# Patient Record
Sex: FEMALE | Race: WHITE | Employment: OTHER | URBAN - METROPOLITAN AREA
[De-identification: names, ages, dates, MRNs, and addresses within clinical notes are randomized per-mention and may not be internally consistent; named-entity substitution may affect disease eponyms.]

---

## 2017-02-20 ENCOUNTER — HOSPITAL ENCOUNTER (EMERGENCY)
Age: 57
Discharge: HOME | End: 2017-02-21
Attending: EMERGENCY MEDICINE
Payer: COMMERCIAL

## 2017-02-20 ENCOUNTER — APPOINTMENT (OUTPATIENT)
Dept: GENERAL RADIOLOGY | Age: 57
End: 2017-02-20
Attending: EMERGENCY MEDICINE
Payer: COMMERCIAL

## 2017-02-20 DIAGNOSIS — R10.13 ABDOMINAL PAIN, EPIGASTRIC: Primary | ICD-10-CM

## 2017-02-20 DIAGNOSIS — Z79.01 ANTICOAGULANT LONG-TERM USE: ICD-10-CM

## 2017-02-20 DIAGNOSIS — R11.2 NAUSEA AND VOMITING, INTRACTABILITY OF VOMITING NOT SPECIFIED, UNSPECIFIED VOMITING TYPE: ICD-10-CM

## 2017-02-20 LAB
ALBUMIN SERPL BCP-MCNC: 4.4 G/DL (ref 3.5–5)
ALBUMIN/GLOB SERPL: 1.2 {RATIO} (ref 1.1–2.2)
ALP SERPL-CCNC: 64 U/L (ref 45–117)
ALT SERPL-CCNC: 35 U/L (ref 12–78)
ANION GAP BLD CALC-SCNC: 9 MMOL/L (ref 5–15)
AST SERPL W P-5'-P-CCNC: 24 U/L (ref 15–37)
BASOPHILS # BLD AUTO: 0 K/UL (ref 0–0.1)
BASOPHILS # BLD: 0 % (ref 0–1)
BILIRUB SERPL-MCNC: 0.2 MG/DL (ref 0.2–1)
BUN SERPL-MCNC: 18 MG/DL (ref 6–20)
BUN/CREAT SERPL: 26 (ref 12–20)
CALCIUM SERPL-MCNC: 9 MG/DL (ref 8.5–10.1)
CHLORIDE SERPL-SCNC: 103 MMOL/L (ref 97–108)
CK SERPL-CCNC: 145 U/L (ref 26–192)
CO2 SERPL-SCNC: 26 MMOL/L (ref 21–32)
CREAT SERPL-MCNC: 0.69 MG/DL (ref 0.55–1.02)
EOSINOPHIL # BLD: 0 K/UL (ref 0–0.4)
EOSINOPHIL NFR BLD: 0 % (ref 0–7)
ERYTHROCYTE [DISTWIDTH] IN BLOOD BY AUTOMATED COUNT: 13 % (ref 11.5–14.5)
GLOBULIN SER CALC-MCNC: 3.6 G/DL (ref 2–4)
GLUCOSE SERPL-MCNC: 125 MG/DL (ref 65–100)
HCT VFR BLD AUTO: 42.5 % (ref 35–47)
HGB BLD-MCNC: 14.5 G/DL (ref 11.5–16)
INR BLD: 2.7 (ref 0.9–1.2)
LYMPHOCYTES # BLD AUTO: 7 % (ref 12–49)
LYMPHOCYTES # BLD: 0.9 K/UL (ref 0.8–3.5)
MAGNESIUM SERPL-MCNC: 1.9 MG/DL (ref 1.6–2.4)
MCH RBC QN AUTO: 30.1 PG (ref 26–34)
MCHC RBC AUTO-ENTMCNC: 34.1 G/DL (ref 30–36.5)
MCV RBC AUTO: 88.4 FL (ref 80–99)
MONOCYTES # BLD: 0.7 K/UL (ref 0–1)
MONOCYTES NFR BLD AUTO: 5 % (ref 5–13)
NEUTS SEG # BLD: 12 K/UL (ref 1.8–8)
NEUTS SEG NFR BLD AUTO: 88 % (ref 32–75)
PLATELET # BLD AUTO: 227 K/UL (ref 150–400)
POTASSIUM SERPL-SCNC: 3.8 MMOL/L (ref 3.5–5.1)
PROT SERPL-MCNC: 8 G/DL (ref 6.4–8.2)
RBC # BLD AUTO: 4.81 M/UL (ref 3.8–5.2)
SODIUM SERPL-SCNC: 138 MMOL/L (ref 136–145)
TROPONIN I SERPL-MCNC: <0.04 NG/ML
WBC # BLD AUTO: 13.7 K/UL (ref 3.6–11)

## 2017-02-20 PROCEDURE — 80053 COMPREHEN METABOLIC PANEL: CPT | Performed by: EMERGENCY MEDICINE

## 2017-02-20 PROCEDURE — 84484 ASSAY OF TROPONIN QUANT: CPT | Performed by: EMERGENCY MEDICINE

## 2017-02-20 PROCEDURE — 94762 N-INVAS EAR/PLS OXIMTRY CONT: CPT

## 2017-02-20 PROCEDURE — 82550 ASSAY OF CK (CPK): CPT | Performed by: EMERGENCY MEDICINE

## 2017-02-20 PROCEDURE — 74011000250 HC RX REV CODE- 250: Performed by: EMERGENCY MEDICINE

## 2017-02-20 PROCEDURE — 74011250636 HC RX REV CODE- 250/636: Performed by: EMERGENCY MEDICINE

## 2017-02-20 PROCEDURE — 99284 EMERGENCY DEPT VISIT MOD MDM: CPT

## 2017-02-20 PROCEDURE — 74011250637 HC RX REV CODE- 250/637: Performed by: EMERGENCY MEDICINE

## 2017-02-20 PROCEDURE — 93005 ELECTROCARDIOGRAM TRACING: CPT

## 2017-02-20 PROCEDURE — 71020 XR CHEST PA LAT: CPT

## 2017-02-20 PROCEDURE — 83690 ASSAY OF LIPASE: CPT | Performed by: EMERGENCY MEDICINE

## 2017-02-20 PROCEDURE — 83605 ASSAY OF LACTIC ACID: CPT | Performed by: EMERGENCY MEDICINE

## 2017-02-20 PROCEDURE — 36415 COLL VENOUS BLD VENIPUNCTURE: CPT | Performed by: EMERGENCY MEDICINE

## 2017-02-20 PROCEDURE — 85610 PROTHROMBIN TIME: CPT

## 2017-02-20 PROCEDURE — 85025 COMPLETE CBC W/AUTO DIFF WBC: CPT | Performed by: EMERGENCY MEDICINE

## 2017-02-20 PROCEDURE — 96361 HYDRATE IV INFUSION ADD-ON: CPT

## 2017-02-20 PROCEDURE — 96374 THER/PROPH/DIAG INJ IV PUSH: CPT

## 2017-02-20 PROCEDURE — 83735 ASSAY OF MAGNESIUM: CPT | Performed by: EMERGENCY MEDICINE

## 2017-02-20 PROCEDURE — 96375 TX/PRO/DX INJ NEW DRUG ADDON: CPT

## 2017-02-20 RX ORDER — PROMETHAZINE HYDROCHLORIDE 25 MG/1
25 TABLET ORAL
Status: COMPLETED | OUTPATIENT
Start: 2017-02-20 | End: 2017-02-20

## 2017-02-20 RX ORDER — ONDANSETRON 4 MG/1
4 TABLET, ORALLY DISINTEGRATING ORAL
Status: DISCONTINUED | OUTPATIENT
Start: 2017-02-20 | End: 2017-02-20

## 2017-02-20 RX ORDER — EZETIMIBE 10 MG/1
10 TABLET ORAL DAILY
COMMUNITY

## 2017-02-20 RX ORDER — LANOLIN ALCOHOL/MO/W.PET/CERES
500 CREAM (GRAM) TOPICAL DAILY
COMMUNITY

## 2017-02-20 RX ORDER — FAMOTIDINE 10 MG/ML
20 INJECTION INTRAVENOUS
Status: COMPLETED | OUTPATIENT
Start: 2017-02-20 | End: 2017-02-20

## 2017-02-20 RX ORDER — ONDANSETRON 2 MG/ML
4 INJECTION INTRAMUSCULAR; INTRAVENOUS
Status: COMPLETED | OUTPATIENT
Start: 2017-02-20 | End: 2017-02-20

## 2017-02-20 RX ORDER — WARFARIN SODIUM 5 MG/1
10 TABLET ORAL DAILY
COMMUNITY

## 2017-02-20 RX ORDER — LISINOPRIL 5 MG/1
5 TABLET ORAL 2 TIMES DAILY
COMMUNITY

## 2017-02-20 RX ORDER — MELATONIN
1000 DAILY
COMMUNITY

## 2017-02-20 RX ADMIN — ONDANSETRON HYDROCHLORIDE 4 MG: 2 INJECTION, SOLUTION INTRAMUSCULAR; INTRAVENOUS at 23:17

## 2017-02-20 RX ADMIN — SODIUM CHLORIDE 1000 ML: 900 INJECTION, SOLUTION INTRAVENOUS at 23:16

## 2017-02-20 RX ADMIN — PROMETHAZINE HYDROCHLORIDE 25 MG: 25 TABLET ORAL at 21:54

## 2017-02-20 RX ADMIN — FAMOTIDINE 20 MG: 10 INJECTION, SOLUTION INTRAVENOUS at 23:17

## 2017-02-21 ENCOUNTER — APPOINTMENT (OUTPATIENT)
Dept: CT IMAGING | Age: 57
End: 2017-02-21
Attending: EMERGENCY MEDICINE
Payer: COMMERCIAL

## 2017-02-21 VITALS
OXYGEN SATURATION: 100 % | HEIGHT: 65 IN | TEMPERATURE: 98 F | DIASTOLIC BLOOD PRESSURE: 62 MMHG | HEART RATE: 100 BPM | SYSTOLIC BLOOD PRESSURE: 145 MMHG | RESPIRATION RATE: 16 BRPM | WEIGHT: 172.4 LBS | BODY MASS INDEX: 28.72 KG/M2

## 2017-02-21 LAB
APPEARANCE UR: CLEAR
BACTERIA URNS QL MICRO: NEGATIVE /HPF
BILIRUB UR QL: NEGATIVE
COLOR UR: NORMAL
EPITH CASTS URNS QL MICRO: NORMAL /LPF
GLUCOSE UR STRIP.AUTO-MCNC: NEGATIVE MG/DL
HGB UR QL STRIP: NEGATIVE
KETONES UR QL STRIP.AUTO: NEGATIVE MG/DL
LACTATE SERPL-SCNC: 1.8 MMOL/L (ref 0.4–2)
LEUKOCYTE ESTERASE UR QL STRIP.AUTO: NEGATIVE
LIPASE SERPL-CCNC: 122 U/L (ref 73–393)
NITRITE UR QL STRIP.AUTO: NEGATIVE
PH UR STRIP: 7.5 [PH] (ref 5–8)
PROT UR STRIP-MCNC: NEGATIVE MG/DL
RBC #/AREA URNS HPF: NORMAL /HPF (ref 0–5)
SP GR UR REFRACTOMETRY: 1.02 (ref 1–1.03)
UA: UC IF INDICATED,UAUC: NORMAL
UROBILINOGEN UR QL STRIP.AUTO: 0.2 EU/DL (ref 0.2–1)
WBC URNS QL MICRO: NORMAL /HPF (ref 0–4)

## 2017-02-21 PROCEDURE — 81001 URINALYSIS AUTO W/SCOPE: CPT | Performed by: EMERGENCY MEDICINE

## 2017-02-21 PROCEDURE — 74011250636 HC RX REV CODE- 250/636: Performed by: EMERGENCY MEDICINE

## 2017-02-21 PROCEDURE — 74011636320 HC RX REV CODE- 636/320: Performed by: EMERGENCY MEDICINE

## 2017-02-21 RX ORDER — PROCHLORPERAZINE EDISYLATE 5 MG/ML
5 INJECTION INTRAMUSCULAR; INTRAVENOUS
Status: COMPLETED | OUTPATIENT
Start: 2017-02-21 | End: 2017-02-21

## 2017-02-21 RX ORDER — ONDANSETRON 4 MG/1
4 TABLET, ORALLY DISINTEGRATING ORAL
Qty: 10 TAB | Refills: 0 | Status: SHIPPED | OUTPATIENT
Start: 2017-02-21

## 2017-02-21 RX ORDER — SODIUM CHLORIDE 0.9 % (FLUSH) 0.9 %
10 SYRINGE (ML) INJECTION
Status: DISCONTINUED | OUTPATIENT
Start: 2017-02-21 | End: 2017-02-21 | Stop reason: HOSPADM

## 2017-02-21 RX ORDER — SODIUM CHLORIDE 9 MG/ML
50 INJECTION, SOLUTION INTRAVENOUS
Status: DISCONTINUED | OUTPATIENT
Start: 2017-02-21 | End: 2017-02-21 | Stop reason: HOSPADM

## 2017-02-21 RX ORDER — PROMETHAZINE HYDROCHLORIDE 25 MG/1
25 SUPPOSITORY RECTAL
Qty: 12 SUPPOSITORY | Refills: 0 | Status: SHIPPED | OUTPATIENT
Start: 2017-02-21

## 2017-02-21 RX ORDER — DIPHENHYDRAMINE HYDROCHLORIDE 50 MG/ML
12.5 INJECTION, SOLUTION INTRAMUSCULAR; INTRAVENOUS
Status: COMPLETED | OUTPATIENT
Start: 2017-02-21 | End: 2017-02-21

## 2017-02-21 RX ADMIN — DIPHENHYDRAMINE HYDROCHLORIDE 12.5 MG: 50 INJECTION, SOLUTION INTRAMUSCULAR; INTRAVENOUS at 01:06

## 2017-02-21 RX ADMIN — PROCHLORPERAZINE EDISYLATE 5 MG: 5 INJECTION INTRAMUSCULAR; INTRAVENOUS at 01:05

## 2017-02-21 RX ADMIN — DIATRIZOATE MEGLUMINE AND DIATRIZOATE SODIUM 30 ML: 600; 100 SOLUTION ORAL; RECTAL at 01:05

## 2017-02-21 RX ADMIN — SODIUM CHLORIDE 1000 ML: 900 INJECTION, SOLUTION INTRAVENOUS at 01:21

## 2017-02-21 NOTE — ED NOTES
Pt resting in stretcher. Call bell within reach. Pt updated plan of care. Provided with medications. Hooked up to monitor. Awaiting lab results. No other complaints voiced at this time.

## 2017-02-21 NOTE — DISCHARGE INSTRUCTIONS
Abdominal Pain: Care Instructions  Your Care Instructions    Abdominal pain has many possible causes. Some aren't serious and get better on their own in a few days. Others need more testing and treatment. If your pain continues or gets worse, you need to be rechecked and may need more tests to find out what is wrong. You may need surgery to correct the problem. Don't ignore new symptoms, such as fever, nausea and vomiting, urination problems, pain that gets worse, and dizziness. These may be signs of a more serious problem. Your doctor may have recommended a follow-up visit in the next 8 to 12 hours. If you are not getting better, you may need more tests or treatment. The doctor has checked you carefully, but problems can develop later. If you notice any problems or new symptoms, get medical treatment right away. Follow-up care is a key part of your treatment and safety. Be sure to make and go to all appointments, and call your doctor if you are having problems. It's also a good idea to know your test results and keep a list of the medicines you take. How can you care for yourself at home? · Rest until you feel better. · To prevent dehydration, drink plenty of fluids, enough so that your urine is light yellow or clear like water. Choose water and other caffeine-free clear liquids until you feel better. If you have kidney, heart, or liver disease and have to limit fluids, talk with your doctor before you increase the amount of fluids you drink. · If your stomach is upset, eat mild foods, such as rice, dry toast or crackers, bananas, and applesauce. Try eating several small meals instead of two or three large ones. · Wait until 48 hours after all symptoms have gone away before you have spicy foods, alcohol, and drinks that contain caffeine. · Do not eat foods that are high in fat. · Avoid anti-inflammatory medicines such as aspirin, ibuprofen (Advil, Motrin), and naproxen (Aleve).  These can cause stomach upset. Talk to your doctor if you take daily aspirin for another health problem. When should you call for help? Call 911 anytime you think you may need emergency care. For example, call if:  · You passed out (lost consciousness). · You pass maroon or very bloody stools. · You vomit blood or what looks like coffee grounds. · You have new, severe belly pain. Call your doctor now or seek immediate medical care if:  · Your pain gets worse, especially if it becomes focused in one area of your belly. · You have a new or higher fever. · Your stools are black and look like tar, or they have streaks of blood. · You have unexpected vaginal bleeding. · You have symptoms of a urinary tract infection. These may include:  ¨ Pain when you urinate. ¨ Urinating more often than usual.  ¨ Blood in your urine. · You are dizzy or lightheaded, or you feel like you may faint. Watch closely for changes in your health, and be sure to contact your doctor if:  · You are not getting better after 1 day (24 hours). Where can you learn more? Go to http://leahNuservgen.info/. Enter Z521 in the search box to learn more about \"Abdominal Pain: Care Instructions. \"  Current as of: May 27, 2016  Content Version: 11.1  © 5801-3783 [employer]. Care instructions adapted under license by [employer] (which disclaims liability or warranty for this information). If you have questions about a medical condition or this instruction, always ask your healthcare professional. [place] [address] any warranty or liability for your use of this information. Nausea and Vomiting: Care Instructions  Your Care Instructions    When you are nauseated, you may feel weak and sweaty and notice a lot of saliva in your mouth. Nausea often leads to vomiting. Most of the time you do not need to worry about nausea and vomiting, but they can be signs of other illnesses.   Two common causes of nausea and vomiting are stomach flu and food poisoning. Nausea and vomiting from viral stomach flu will usually start to improve within 24 hours. Nausea and vomiting from food poisoning may last from 12 to 48 hours. The doctor has checked you carefully, but problems can develop later. If you notice any problems or new symptoms, get medical treatment right away. Follow-up care is a key part of your treatment and safety. Be sure to make and go to all appointments, and call your doctor if you are having problems. It's also a good idea to know your test results and keep a list of the medicines you take. How can you care for yourself at home? · To prevent dehydration, drink plenty of fluids, enough so that your urine is light yellow or clear like water. Choose water and other caffeine-free clear liquids until you feel better. If you have kidney, heart, or liver disease and have to limit fluids, talk with your doctor before you increase the amount of fluids you drink. · Rest in bed until you feel better. · When you are able to eat, try clear soups, mild foods, and liquids until all symptoms are gone for 12 to 48 hours. Other good choices include dry toast, crackers, cooked cereal, and gelatin dessert, such as Jell-O. When should you call for help? Call 911 anytime you think you may need emergency care. For example, call if:  · You passed out (lost consciousness). Call your doctor now or seek immediate medical care if:  · You have symptoms of dehydration, such as:  ¨ Dry eyes and a dry mouth. ¨ Passing only a little dark urine. ¨ Feeling thirstier than usual.  · You have new or worsening belly pain. · You have a new or higher fever. · You vomit blood or what looks like coffee grounds. Watch closely for changes in your health, and be sure to contact your doctor if:  · You have ongoing nausea and vomiting. · Your vomiting is getting worse. · Your vomiting lasts longer than 2 days.   · You are not getting better as expected. Where can you learn more? Go to http://leah-gen.info/. Enter 25 [id number] in the search box to learn more about \"Nausea and Vomiting: Care Instructions. \"  Current as of: May 27, 2016  Content Version: 11.1  © 6652-1473 [employer]. Care instructions adapted under license by [employer] (which disclaims liability or warranty for this information). If you have questions about a medical condition or this instruction, always ask your healthcare professional. [address] any warranty or liability for your use of this information.

## 2017-02-21 NOTE — ED NOTES
CT tech at bedside to take pt for imaging. Pt refusing to go to CT. Spoke with Dr. Jeannie Carlin who came to bedside to speak with pt. Pt now agrees to have imaging done. CT tech returned to room to take pt for scan. Pt again refuses to have imaging done and wishes to be discharged. Dr. Jeannie Carlin made aware. Discussed with pt at length several times need for CT. Pt continues to refuse and wants to be discharged. Saline lock removed. Pt discharged via wheelchair with ED staff. Accompanied by family.

## 2017-02-21 NOTE — ED NOTES
Assumed care of pt at this time, received bedside report from Emi Krause [facility] with pt included in care. Pt is resting in room, with call bell in reach. All questions answered.

## 2017-02-21 NOTE — ED NOTES
Pt states she is unable to drink anymore CT contrast. Spoke with Dr. Tana Mosley, she would like to proceed with scan. Spoke with CT tech who states they will send for her.

## 2017-02-21 NOTE — ED NOTES
Bedside and Verbal shift change report given to ANDREW Limon RN (oncoming nurse) by Elmarie Nelson. Stellwag, RN (offgoing nurse). Report included the following information SBAR. Pt to be transferred to room 10 after CT.

## 2017-02-21 NOTE — ED NOTES
Bedside shift change report given to Brennan Quintana RN (oncoming nurse) by Mecca Neff RN (offgoing nurse). Report included the following information ED Summary.

## 2017-02-21 NOTE — ED TRIAGE NOTES
Pt received to room from [facility]. Reporting had been at the mall and had a chicken panini with pesto for lunch around 1500. Reports then around 1900 began getting nauseated and vomited. Also got dizzy. Denies any recent fever or chills at this time.

## 2017-02-21 NOTE — ED PROVIDER NOTES
HPI Comments: Vanessa Brennan is a 49 y.o. female, pmhx PE / HTN, who presents ambulatory to the ED c/o multiple episodes of nausea, vomiting, and generalized malaise x 1900 this evening. Pt reports additional HA, abd pain, chills, and a subjective fever. She further notes becoming dizzy when standing and expresses concern for her elevated BP (162/112) following onset of sxs this evening. Pt states she has had heart burn since lunch this afternoon and has taken Tums, Omeprazole, and Cyndy [place] with no relief of sxs. She denies any hx of abd surgery. Pt reports a hx of hemorrhoids, hypercholesterolemia, HTN, and PE. She notes adherence with her rx'd Coumadin and denies any excess use of NSAIDS. She specifically denies any recent diarrhea, urinary sxs, dizziness, heart palpitations, CP, or BLE swelling. PCP: Kerry Guardado MD    Allergies: NKDA  PMHx: Significant for PE, HTN, hypercholesterolemia, hemorrhoids  PSHx: Significant for orthopedic surgery  Social Hx: -tobacco (former), -EtOH, -Illicit Drugs     There are no other complaints, changes, or physical findings at this time. The history is provided by the patient. Past Medical History:   Diagnosis Date    Hypertension     Thromboembolus [facility])      pulmonary emboli bilaterally       Past Surgical History:   Procedure Laterality Date    Hx orthopaedic       \"hammer toe surgery\", carpal tunnel bilateral hands, and spinal effusion         History reviewed. No pertinent family history. Social History     Social History    Marital status:      Spouse name: N/A    Number of children: N/A    Years of education: N/A     Occupational History    Not on file.      Social History Main Topics    Smoking status: Former Smoker     Packs/day: 1.50     Years: 13.00    Smokeless tobacco: Not on file    Alcohol use No    Drug use: No    Sexual activity: Not on file     Other Topics Concern    Not on file     Social History Narrative    No narrative on file         ALLERGIES: Review of patient's allergies indicates no known allergies. Review of Systems   Constitutional: Positive for chills and fever.        +generalized malaise   Eyes: Negative. Respiratory: Negative. Negative for shortness of breath. Cardiovascular: Negative for chest pain, palpitations and leg swelling. Gastrointestinal: Positive for nausea and vomiting. Negative for abdominal pain and diarrhea. Endocrine: Negative. Genitourinary: Negative. Negative for difficulty urinating, dysuria, flank pain, frequency and hematuria. Musculoskeletal: Negative. Skin: Negative. Allergic/Immunologic: Negative. Neurological: Negative. Negative for dizziness. Psychiatric/Behavioral: Negative for suicidal ideas. All other systems reviewed and are negative. Patient Vitals for the past 12 hrs:   Temp Pulse Resp BP SpO2   02/21/17 0227 - 100 - 145/62 100 %   02/21/17 0100 - (!) 104 - 155/84 97 %   02/21/17 0001 - 96 - 139/59 100 %   02/20/17 2345 - - - 138/61 100 %   02/20/17 2330 - - - 147/63 100 %   02/20/17 2329 - - - 151/50 -   02/20/17 2108 98 °F (36.7 °C) (!) 104 16 (!) 147/93 100 %             Physical Exam   Nursing note and vitals reviewed.   General appearance - well nourished, well appearing, and in no distress  Eyes - pupils equal and reactive, extraocular eye movements intact  ENT - mucous membranes moist, pharynx normal without lesions  Neck - supple, no significant adenopathy; non-tender to palpation  Chest - clear to auscultation, no wheezes, rales or rhonchi; non-tender to palpation  Heart - normal rate and regular rhythm, S1 and S2 normal, no murmurs noted  Abdomen - soft, mild generalized abd tenderness to palpation greater in the epigastric region, nondistended, no masses or organomegaly  Musculoskeletal - no joint tenderness, deformity or swelling; normal ROM  Extremities - peripheral pulses normal, no pedal edema  Skin - normal coloration and turgor, no rashes  Neurological - alert, oriented x3, normal speech, no focal findings or movement disorder noted  Written by LM Ochoa, as dictated by Keenan Pinzon MD    MDM  Number of Diagnoses or Management Options  Diagnosis management comments: DDx: gastroenteritis, dehydration, electrolyte abnormality, orthostatic HTN, viral syndrome, SBO       Amount and/or Complexity of Data Reviewed  Clinical lab tests: reviewed and ordered  Tests in the radiology section of CPT®: reviewed and ordered  Tests in the medicine section of CPT®: ordered and reviewed  Review and summarize past medical records: yes  Independent visualization of images, tracings, or specimens: yes    Patient Progress  Patient progress: stable        Procedures    EKG interpretation: (Preliminary) 2114  Rhythm: normal sinus rhythm. Rate (approx.): 96bpm; Axis: normal; Normal MO, QRS intervals, prolonged QTc intervals; ST/T wave: non-specific changes; Other findings: none. Written by LM Acevedo, as dictated by Keenan Pinzon MD      PROGRESS NOTE:  12:13 AM  Pt reevaluated. Pt states her current \"discomfort\" level is 4/10. Pt requesting discharge at this time. Written by LM Ochoa, as dictated by Keenan Pinzon MD    PROGRESS NOTE:  1:12 AM  Pt reevaluated. Spoke with pt and  regarding pt's request for discharge; updated on all available lab and imaging findings. Discussed importance of further evaluation via CT scan. Pt remains tachycardic and nauseated. She states the request for discharge came from her  who does not wish to wait. Pt agrees to stay for scan and additional IVF,  agrees to come pick the pt up as needed. Written by LM Ochoa, as dictated by Keenan Pinzon MD    PROGRESS NOTE:  2:41 AM  Pt reevaluated. Pt able to finish 1/2 of her contrast. Now states she does not wish to wait for the CT scan.  Requesting discharge again at this time. Discussed cannot r/o infection or bowel obstruction; pt agreed to have CT scan. When CT tech tried to take pt for imaging pt again stated she does not want the scan and wishes to be discharged. Pt given return precautions. Updated on all available lab and imaging findings. Pt agrees to follow up with her PCP as scheduled tomorrow. Vital signs stable for discharge. Written by Lotus Lisa ED Scribe, as dictated by Steffanie Baca MD    LABORATORY TESTS:  Recent Results (from the past 12 hour(s))   EKG, 12 LEAD, INITIAL    Collection Time: 02/20/17  9:14 PM   Result Value Ref Range    Ventricular Rate 96 BPM    Atrial Rate 96 BPM    P-R Interval 144 ms    QRS Duration 88 ms    Q-T Interval 382 ms    QTC Calculation (Bezet) 482 ms    Calculated P Axis 56 degrees    Calculated R Axis 37 degrees    Calculated T Axis 51 degrees    Diagnosis       Normal sinus rhythm  Prolonged QT  Abnormal ECG  No previous ECGs available     CBC WITH AUTOMATED DIFF    Collection Time: 02/20/17  9:47 PM   Result Value Ref Range    WBC 13.7 (H) 3.6 - 11.0 K/uL    RBC 4.81 3.80 - 5.20 M/uL    HGB 14.5 11.5 - 16.0 g/dL    HCT 42.5 35.0 - 47.0 %    MCV 88.4 80.0 - 99.0 FL    MCH 30.1 26.0 - 34.0 PG    MCHC 34.1 30.0 - 36.5 g/dL    RDW 13.0 11.5 - 14.5 %    PLATELET 135 463 - 577 K/uL    NEUTROPHILS 88 (H) 32 - 75 %    LYMPHOCYTES 7 (L) 12 - 49 %    MONOCYTES 5 5 - 13 %    EOSINOPHILS 0 0 - 7 %    BASOPHILS 0 0 - 1 %    ABS. NEUTROPHILS 12.0 (H) 1.8 - 8.0 K/UL    ABS. LYMPHOCYTES 0.9 0.8 - 3.5 K/UL    ABS. MONOCYTES 0.7 0.0 - 1.0 K/UL    ABS. EOSINOPHILS 0.0 0.0 - 0.4 K/UL    ABS.  BASOPHILS 0.0 0.0 - 0.1 K/UL   METABOLIC PANEL, COMPREHENSIVE    Collection Time: 02/20/17  9:47 PM   Result Value Ref Range    Sodium 138 136 - 145 mmol/L    Potassium 3.8 3.5 - 5.1 mmol/L    Chloride 103 97 - 108 mmol/L    CO2 26 21 - 32 mmol/L    Anion gap 9 5 - 15 mmol/L    Glucose 125 (H) 65 - 100 mg/dL    BUN 18 6 - 20 MG/DL    Creatinine 0.69 0.55 - 1.02 MG/DL    BUN/Creatinine ratio 26 (H) 12 - 20      GFR est AA >60 >60 ml/min/1.73m2    GFR est non-AA >60 >60 ml/min/1.73m2    Calcium 9.0 8.5 - 10.1 MG/DL    Bilirubin, total 0.2 0.2 - 1.0 MG/DL    ALT (SGPT) 35 12 - 78 U/L    AST (SGOT) 24 15 - 37 U/L    Alk. phosphatase 64 45 - 117 U/L    Protein, total 8.0 6.4 - 8.2 g/dL    Albumin 4.4 3.5 - 5.0 g/dL    Globulin 3.6 2.0 - 4.0 g/dL    A-G Ratio 1.2 1.1 - 2.2     TROPONIN I    Collection Time: 02/20/17  9:47 PM   Result Value Ref Range    Troponin-I, Qt. <0.04 <0.05 ng/mL   CK W/ REFLX CKMB    Collection Time: 02/20/17  9:47 PM   Result Value Ref Range     26 - 192 U/L   MAGNESIUM    Collection Time: 02/20/17  9:47 PM   Result Value Ref Range    Magnesium 1.9 1.6 - 2.4 mg/dL   LIPASE    Collection Time: 02/20/17 11:06 PM   Result Value Ref Range    Lipase 122 73 - 393 U/L   LACTIC ACID, PLASMA    Collection Time: 02/20/17 11:06 PM   Result Value Ref Range    Lactic acid 1.8 0.4 - 2.0 MMOL/L   POC INR    Collection Time: 02/20/17 11:39 PM   Result Value Ref Range    INR (POC) 2.7 (H) <1.2     URINALYSIS W/ REFLEX CULTURE    Collection Time: 02/21/17  1:20 AM   Result Value Ref Range    Color YELLOW/STRAW      Appearance CLEAR CLEAR      Specific gravity 1.016 1.003 - 1.030      pH (UA) 7.5 5.0 - 8.0      Protein NEGATIVE  NEG mg/dL    Glucose NEGATIVE  NEG mg/dL    Ketone NEGATIVE  NEG mg/dL    Bilirubin NEGATIVE  NEG      Blood NEGATIVE  NEG      Urobilinogen 0.2 0.2 - 1.0 EU/dL    Nitrites NEGATIVE  NEG      Leukocyte Esterase NEGATIVE  NEG      WBC 0-4 0 - 4 /hpf    RBC 0-5 0 - 5 /hpf    Epithelial cells FEW FEW /lpf    Bacteria NEGATIVE  NEG /hpf    UA:UC IF INDICATED CULTURE NOT INDICATED BY UA RESULT CNI         IMAGING RESULTS:     CXR Results  (Last 48 hours)               02/20/17 2237  XR CHEST PA LAT Final result    Impression:  IMPRESSION: No acute cardiopulmonary disease. Narrative: Indication: Dizzy, vomiting.        Exam: PA and lateral views of the chest.       There is no prior study for direct comparison. Findings: Cardiomediastinal silhouette is within normal limits. Lungs are clear   bilaterally. Pleural spaces are normal. Osseous structures are intact. MEDICATIONS GIVEN:  Medications   0.9% sodium chloride infusion (not administered)   iopamidol (ISOVUE-370) 76 % injection 100 mL (not administered)   sodium chloride (NS) flush 10 mL (not administered)   promethazine (PHENERGAN) tablet 25 mg (25 mg Oral Given 2/20/17 2154)   famotidine (PF) (PEPCID) injection 20 mg (20 mg IntraVENous Given 2/20/17 2317)   ondansetron (ZOFRAN) injection 4 mg (4 mg IntraVENous Given 2/20/17 2317)   sodium chloride 0.9 % bolus infusion 1,000 mL (1,000 mL IntraVENous New Bag 2/20/17 2316)   sodium chloride 0.9 % bolus infusion 1,000 mL (1,000 mL IntraVENous New Bag 2/21/17 0121)   prochlorperazine (COMPAZINE) injection 5 mg (5 mg IntraVENous Given 2/21/17 0105)   diphenhydrAMINE (BENADRYL) injection 12.5 mg (12.5 mg IntraVENous Given 2/21/17 0106)   diatrizoate meglumine-d.sodium (MD-GASTROVIEW,GASTROGRAFIN) 66-10 % contrast solution 30 mL (30 mL Oral Given 2/21/17 0105)       IMPRESSION:  1. Abdominal pain, epigastric    2. Anticoagulant long-term use    3. Nausea and vomiting, intractability of vomiting not specified, unspecified vomiting type        PLAN:  1. Current Discharge Medication List      START taking these medications    Details   ondansetron (ZOFRAN ODT) 4 mg disintegrating tablet Take 1 Tab by mouth every eight (8) hours as needed for Nausea. Qty: 10 Tab, Refills: 0      promethazine (PHENERGAN) 25 mg suppository Insert 1 Suppository into rectum every six (6) hours as needed for Nausea. Qty: 12 Suppository, Refills: 0         CONTINUE these medications which have NOT CHANGED    Details   warfarin (COUMADIN) 5 mg tablet Take 10 mg by mouth daily.       lisinopril (PRINIVIL, ZESTRIL) 5 mg tablet Take 5 mg by mouth two (2) times a day.      ezetimibe (ZETIA) 10 mg tablet Take 10 mg by mouth daily. VIT C/E/ZN/COPPR/LUTEIN/ZEAXAN (PRESERVISION AREDS 2 PO) Take  by mouth daily. cyanocobalamin (VITAMIN B12) 500 mcg tablet Take 500 mcg by mouth daily. calcium carbonate-vitamin D3 (CALCIUM PETITES) 200 (500)-400 mg-unit cap Take 200 mg by mouth three (3) times daily. cholecalciferol (VITAMIN D3) 1,000 unit tablet Take 1,000 Units by mouth daily. 2.   Follow-up Information     Follow up With Details Comments Jameel Benitez MD In 2 days  [address]  [phone]      [facility] EMERGENCY DEPT  If symptoms worsen [address] [place] [address]  [phone]        Return to ED if worse     DISCHARGE NOTE:  2:46 AM  The patient's results have been reviewed with family and/or caregiver. They verbally convey their understanding and agreement of the patient's signs, symptoms, diagnosis, treatment, and prognosis. They additionally agree to follow up as recommended in the discharge instructions or to return to the Emergency Room should the patient's condition change prior to their follow-up appointment. The family and/or caregiver verbally agrees with the care-plan and all of their questions have been answered. The discharge instructions have also been provided to the them along with educational information regarding the patient's diagnosis and a list of reasons why the patient would want to return to the ER prior to their follow-up appointment should their condition change. Written by Ros eMary Barlow, ED Scribe, as dictated by Deangelo Araujo MD.         This note is prepared by Rose Mary Barlow, acting as Scribe for MD Steffanie Love MD  : The scribe's documentation has been prepared under my direction and personally reviewed by me in its entirety.  I confirm that the note above accurately reflects all work, treatment, procedures, and medical decision making performed by me. This note will not be viewable in [address]

## 2017-02-21 NOTE — ED NOTES
Pt resting in stretcher. Call bell within reach. Pt updated on plan of care.     Blood obtained and am running POC PT/INR

## 2017-02-22 LAB
ATRIAL RATE: 96 BPM
CALCULATED P AXIS, ECG09: 56 DEGREES
CALCULATED R AXIS, ECG10: 37 DEGREES
CALCULATED T AXIS, ECG11: 51 DEGREES
DIAGNOSIS, 93000: NORMAL
P-R INTERVAL, ECG05: 144 MS
Q-T INTERVAL, ECG07: 382 MS
QRS DURATION, ECG06: 88 MS
QTC CALCULATION (BEZET), ECG08: 482 MS
VENTRICULAR RATE, ECG03: 96 BPM

## 2017-03-02 ENCOUNTER — HOSPITAL ENCOUNTER (OUTPATIENT)
Dept: GENERAL RADIOLOGY | Age: 57
Discharge: HOME | End: 2017-03-02
Payer: COMMERCIAL

## 2017-03-02 DIAGNOSIS — R05.9 COUGH: ICD-10-CM

## 2017-03-02 DIAGNOSIS — R50.9 FEVER: ICD-10-CM

## 2017-03-02 PROCEDURE — 71020 XR CHEST PA LAT: CPT

## 2018-04-04 ENCOUNTER — HOSPITAL ENCOUNTER (OUTPATIENT)
Dept: ULTRASOUND IMAGING | Age: 58
Discharge: HOME | End: 2018-04-04
Attending: FAMILY MEDICINE
Payer: COMMERCIAL

## 2018-04-04 DIAGNOSIS — M25.511 SUBSCAPULAR PAIN, RIGHT: ICD-10-CM

## 2018-04-04 PROCEDURE — 76705 ECHO EXAM OF ABDOMEN: CPT

## 2018-05-04 ENCOUNTER — APPOINTMENT (OUTPATIENT)
Dept: CT IMAGING | Age: 58
End: 2018-05-04
Attending: EMERGENCY MEDICINE
Payer: COMMERCIAL

## 2018-05-04 ENCOUNTER — HOSPITAL ENCOUNTER (EMERGENCY)
Age: 58
Discharge: HOME | End: 2018-05-04
Attending: EMERGENCY MEDICINE | Admitting: EMERGENCY MEDICINE
Payer: COMMERCIAL

## 2018-05-04 ENCOUNTER — APPOINTMENT (OUTPATIENT)
Dept: GENERAL RADIOLOGY | Age: 58
End: 2018-05-04
Attending: EMERGENCY MEDICINE
Payer: COMMERCIAL

## 2018-05-04 VITALS
WEIGHT: 199.74 LBS | OXYGEN SATURATION: 97 % | BODY MASS INDEX: 33.28 KG/M2 | SYSTOLIC BLOOD PRESSURE: 139 MMHG | RESPIRATION RATE: 20 BRPM | HEART RATE: 81 BPM | HEIGHT: 65 IN | TEMPERATURE: 98.2 F | DIASTOLIC BLOOD PRESSURE: 73 MMHG

## 2018-05-04 DIAGNOSIS — R09.1 PLEURISY: ICD-10-CM

## 2018-05-04 DIAGNOSIS — R07.9 ACUTE CHEST PAIN: Primary | ICD-10-CM

## 2018-05-04 LAB
ALBUMIN SERPL-MCNC: 3.8 G/DL (ref 3.5–5)
ALBUMIN/GLOB SERPL: 1.1 {RATIO} (ref 1.1–2.2)
ALP SERPL-CCNC: 72 U/L (ref 45–117)
ALT SERPL-CCNC: 35 U/L (ref 12–78)
ANION GAP SERPL CALC-SCNC: 5 MMOL/L (ref 5–15)
AST SERPL-CCNC: 22 U/L (ref 15–37)
BASOPHILS # BLD: 0.1 K/UL (ref 0–0.1)
BASOPHILS NFR BLD: 1 % (ref 0–1)
BILIRUB SERPL-MCNC: 0.2 MG/DL (ref 0.2–1)
BNP SERPL-MCNC: 44 PG/ML (ref 0–125)
BUN SERPL-MCNC: 14 MG/DL (ref 6–20)
BUN/CREAT SERPL: 24 (ref 12–20)
CALCIUM SERPL-MCNC: 9 MG/DL (ref 8.5–10.1)
CHLORIDE SERPL-SCNC: 106 MMOL/L (ref 97–108)
CK MB CFR SERPL CALC: 1.1 % (ref 0–2.5)
CK MB SERPL-MCNC: 1.4 NG/ML (ref 5–25)
CK SERPL-CCNC: 128 U/L (ref 26–192)
CO2 SERPL-SCNC: 28 MMOL/L (ref 21–32)
CREAT SERPL-MCNC: 0.58 MG/DL (ref 0.55–1.02)
D DIMER PPP FEU-MCNC: 0.18 MG/L FEU (ref 0–0.65)
DIFFERENTIAL METHOD BLD: NORMAL
EOSINOPHIL # BLD: 0.2 K/UL (ref 0–0.4)
EOSINOPHIL NFR BLD: 2 % (ref 0–7)
ERYTHROCYTE [DISTWIDTH] IN BLOOD BY AUTOMATED COUNT: 13.2 % (ref 11.5–14.5)
GLOBULIN SER CALC-MCNC: 3.5 G/DL (ref 2–4)
GLUCOSE SERPL-MCNC: 130 MG/DL (ref 65–100)
HCT VFR BLD AUTO: 37.9 % (ref 35–47)
HGB BLD-MCNC: 12.9 G/DL (ref 11.5–16)
IMM GRANULOCYTES # BLD: 0 K/UL (ref 0–0.04)
IMM GRANULOCYTES NFR BLD AUTO: 0 % (ref 0–0.5)
INR PPP: 3 (ref 0.9–1.1)
LYMPHOCYTES # BLD: 2.1 K/UL (ref 0.8–3.5)
LYMPHOCYTES NFR BLD: 30 % (ref 12–49)
MCH RBC QN AUTO: 30.1 PG (ref 26–34)
MCHC RBC AUTO-ENTMCNC: 34 G/DL (ref 30–36.5)
MCV RBC AUTO: 88.6 FL (ref 80–99)
MONOCYTES # BLD: 0.5 K/UL (ref 0–1)
MONOCYTES NFR BLD: 7 % (ref 5–13)
NEUTS SEG # BLD: 4.2 K/UL (ref 1.8–8)
NEUTS SEG NFR BLD: 60 % (ref 32–75)
NRBC # BLD: 0 K/UL (ref 0–0.01)
NRBC BLD-RTO: 0 PER 100 WBC
PLATELET # BLD AUTO: 259 K/UL (ref 150–400)
PMV BLD AUTO: 10.3 FL (ref 8.9–12.9)
POTASSIUM SERPL-SCNC: 3.8 MMOL/L (ref 3.5–5.1)
PROT SERPL-MCNC: 7.3 G/DL (ref 6.4–8.2)
PROTHROMBIN TIME: 30.2 SEC (ref 9–11.1)
RBC # BLD AUTO: 4.28 M/UL (ref 3.8–5.2)
SODIUM SERPL-SCNC: 139 MMOL/L (ref 136–145)
TROPONIN I SERPL-MCNC: <0.04 NG/ML
WBC # BLD AUTO: 7 K/UL (ref 3.6–11)

## 2018-05-04 PROCEDURE — 85379 FIBRIN DEGRADATION QUANT: CPT | Performed by: EMERGENCY MEDICINE

## 2018-05-04 PROCEDURE — 71045 X-RAY EXAM CHEST 1 VIEW: CPT

## 2018-05-04 PROCEDURE — 36415 COLL VENOUS BLD VENIPUNCTURE: CPT | Performed by: EMERGENCY MEDICINE

## 2018-05-04 PROCEDURE — 80053 COMPREHEN METABOLIC PANEL: CPT | Performed by: EMERGENCY MEDICINE

## 2018-05-04 PROCEDURE — 85025 COMPLETE CBC W/AUTO DIFF WBC: CPT | Performed by: EMERGENCY MEDICINE

## 2018-05-04 PROCEDURE — 83880 ASSAY OF NATRIURETIC PEPTIDE: CPT | Performed by: EMERGENCY MEDICINE

## 2018-05-04 PROCEDURE — 99284 EMERGENCY DEPT VISIT MOD MDM: CPT

## 2018-05-04 PROCEDURE — 74011250636 HC RX REV CODE- 250/636: Performed by: EMERGENCY MEDICINE

## 2018-05-04 PROCEDURE — 84484 ASSAY OF TROPONIN QUANT: CPT | Performed by: EMERGENCY MEDICINE

## 2018-05-04 PROCEDURE — 71275 CT ANGIOGRAPHY CHEST: CPT

## 2018-05-04 PROCEDURE — 74011636320 HC RX REV CODE- 636/320: Performed by: EMERGENCY MEDICINE

## 2018-05-04 PROCEDURE — 85610 PROTHROMBIN TIME: CPT | Performed by: EMERGENCY MEDICINE

## 2018-05-04 PROCEDURE — 82550 ASSAY OF CK (CPK): CPT | Performed by: EMERGENCY MEDICINE

## 2018-05-04 PROCEDURE — 93005 ELECTROCARDIOGRAM TRACING: CPT

## 2018-05-04 RX ORDER — SODIUM CHLORIDE 9 MG/ML
50 INJECTION, SOLUTION INTRAVENOUS
Status: DISCONTINUED | OUTPATIENT
Start: 2018-05-04 | End: 2018-05-04 | Stop reason: HOSPADM

## 2018-05-04 RX ORDER — SODIUM CHLORIDE 0.9 % (FLUSH) 0.9 %
10 SYRINGE (ML) INJECTION
Status: DISCONTINUED | OUTPATIENT
Start: 2018-05-04 | End: 2018-05-04 | Stop reason: HOSPADM

## 2018-05-04 RX ADMIN — IOPAMIDOL 100 ML: 755 INJECTION, SOLUTION INTRAVENOUS at 19:00

## 2018-05-04 RX ADMIN — SODIUM CHLORIDE 50 ML/HR: 900 INJECTION, SOLUTION INTRAVENOUS at 19:00

## 2018-05-04 RX ADMIN — Medication 10 ML: at 19:00

## 2018-05-04 NOTE — ED NOTES
Assumed care of patient. Patient is alert and oriented, does not appear to be in distress. Patient ambulatory to ED with c/o right sided chest pain worsening over the past two days. Worse with laughing, coughing, and sneezing. Denies sob.  States she had 2 PE's 11 years ago and this feels the same

## 2018-05-04 NOTE — ED PROVIDER NOTES
EMERGENCY DEPARTMENT HISTORY AND PHYSICAL EXAM      Date: 5/4/2018  Patient Name: Ibeth Reis    History of Presenting Illness     Chief Complaint   Patient presents with    Chest Pain     pt reports pain in right side of chest when she coughs or takes a deep breath, history of PE, reports pain feels similar, began yesterday       History Provided By: Patient    HPI: Ibeth Reis, 66 y.o. female with PMHx significant for HTN, bilateral PE, presents ambulatory to the ED with cc of new onset right sided CP since yesterday. Pt describes her pain as a constant and worsening, dull, 4/10 pain. Pt states her pain is a 4/10 currently but is a 7/10 when she takes a deep breath. Her pain is worse with deep breaths, movement, or coughing. Pt's concern today is for a PE. She reports being diagnosed with a bilateral PE 10+ years ago when she had the same symptoms. She is on coumadin and is being followed very closely by a pulmonologist. She had recently followed up with him 3 weeks ago and had her INR checked, which was normal. She did call her pulmonologist and explained her symptoms. She was advised by the pulmonologist to come into the ED. Pt denies any back pain, neck pain, jaw pain, arm pain, nausea, vomiting, cough, HA, dysuria, hematuria, urinary frequency, abdominal pain. Chief Complaint: CP  Duration: yesterday  Timing:  Constant  Location: right side of chest  Quality: Tightness  Severity: Moderate  Modifying Factors: Worse with deep breaths, movement, coughing  Associated Symptoms: none    Social Hx: - Tobacco (former smoker), - EtOH (-), - illicit drug use (-)  Family Hx: HTN, DM. There are no other complaints, changes, or physical findings at this time.     PCP: Zach Fajardo MD    Current Facility-Administered Medications   Medication Dose Route Frequency Provider Last Rate Last Dose    sodium chloride (NS) flush 10 mL  10 mL IntraVENous RAD ONCE Ferd Mohs, MD        iopamidol (ISOVUE-370) 76 % injection 100 mL  100 mL IntraVENous RAD Kristi Cho MD        0.9% sodium chloride infusion  50 mL/hr IntraVENous RAD ONCE Luis Eduardo Wright MD         Current Outpatient Prescriptions   Medication Sig Dispense Refill    ondansetron (ZOFRAN ODT) 4 mg disintegrating tablet Take 1 Tab by mouth every eight (8) hours as needed for Nausea. 10 Tab 0    promethazine (PHENERGAN) 25 mg suppository Insert 1 Suppository into rectum every six (6) hours as needed for Nausea. 12 Suppository 0    warfarin (COUMADIN) 5 mg tablet Take 10 mg by mouth daily.  lisinopril (PRINIVIL, ZESTRIL) 5 mg tablet Take 5 mg by mouth two (2) times a day.  ezetimibe (ZETIA) 10 mg tablet Take 10 mg by mouth daily.  VIT C/E/ZN/COPPR/LUTEIN/ZEAXAN (PRESERVISION AREDS 2 PO) Take  by mouth daily.  cyanocobalamin (VITAMIN B12) 500 mcg tablet Take 500 mcg by mouth daily.  calcium carbonate-vitamin D3 (CALCIUM PETITES) 200 (500)-400 mg-unit cap Take 200 mg by mouth three (3) times daily.  cholecalciferol (VITAMIN D3) 1,000 unit tablet Take 1,000 Units by mouth daily. Past History     Past Medical History:  Past Medical History:   Diagnosis Date    Hypertension     Thromboembolus ([address]     pulmonary emboli bilaterally       Past Surgical History:  Past Surgical History:   Procedure Laterality Date    HX ORTHOPAEDIC      \"hammer toe surgery\", carpal tunnel bilateral hands, and spinal effusion       Family History:  No family history on file. Social History:  Social History   Substance Use Topics    Smoking status: Former Smoker     Packs/day: 1.50     Years: 13.00    Smokeless tobacco: Not on file    Alcohol use No       Allergies:  No Known Allergies      Review of Systems   Review of Systems   Constitutional: Negative for chills and fever. HENT: Negative for congestion and sore throat. Eyes: Negative for visual disturbance.    Respiratory: Negative for cough and shortness of breath. Cardiovascular: Positive for chest pain (right sided). Negative for palpitations. Gastrointestinal: Negative for abdominal pain, nausea and vomiting. Endocrine: Negative for heat intolerance. Genitourinary: Negative for dysuria and hematuria. Musculoskeletal: Negative for back pain. Skin: Negative for pallor and wound. Allergic/Immunologic: Negative for immunocompromised state. Hematological: Does not bruise/bleed easily. Psychiatric/Behavioral: Negative. All other systems reviewed and are negative. Physical Exam   Physical Exam   Constitutional: She is oriented to person, place, and time. She appears well-developed and well-nourished. No distress. NAD   HENT:   Head: Normocephalic and atraumatic. Eyes: EOM are normal. Pupils are equal, round, and reactive to light. Neck: Normal range of motion. Neck supple. Cardiovascular: Normal rate, regular rhythm and normal heart sounds. Pulmonary/Chest: Effort normal and breath sounds normal. No respiratory distress. She exhibits tenderness. Chest wall is tender but not reproducible. Abdominal: Soft. Bowel sounds are normal. She exhibits no mass. There is no tenderness. Musculoskeletal: Normal range of motion. She exhibits no edema. Legs without edema and non tender. Neurological: She is alert and oriented to person, place, and time. Coordination normal.   Skin: Skin is warm and dry. Psychiatric: She has a normal mood and affect. Her behavior is normal.   Nursing note and vitals reviewed.         Diagnostic Study Results     Labs -     Recent Results (from the past 12 hour(s))   EKG, 12 LEAD, INITIAL    Collection Time: 05/04/18  3:05 PM   Result Value Ref Range    Ventricular Rate 92 BPM    Atrial Rate 92 BPM    P-R Interval 144 ms    QRS Duration 86 ms    Q-T Interval 368 ms    QTC Calculation (Bezet) 455 ms    Calculated P Axis 62 degrees    Calculated R Axis 23 degrees    Calculated T Axis 55 degrees    Diagnosis Normal sinus rhythm  Normal ECG  When compared with ECG of 20-FEB-2017 21:14,  No significant change was found     CBC WITH AUTOMATED DIFF    Collection Time: 05/04/18  3:16 PM   Result Value Ref Range    WBC 7.0 3.6 - 11.0 K/uL    RBC 4.28 3.80 - 5.20 M/uL    HGB 12.9 11.5 - 16.0 g/dL    HCT 37.9 35.0 - 47.0 %    MCV 88.6 80.0 - 99.0 FL    MCH 30.1 26.0 - 34.0 PG    MCHC 34.0 30.0 - 36.5 g/dL    RDW 13.2 11.5 - 14.5 %    PLATELET 643 423 - 847 K/uL    MPV 10.3 8.9 - 12.9 FL    NRBC 0.0 0  WBC    ABSOLUTE NRBC 0.00 0.00 - 0.01 K/uL    NEUTROPHILS 60 32 - 75 %    LYMPHOCYTES 30 12 - 49 %    MONOCYTES 7 5 - 13 %    EOSINOPHILS 2 0 - 7 %    BASOPHILS 1 0 - 1 %    IMMATURE GRANULOCYTES 0 0.0 - 0.5 %    ABS. NEUTROPHILS 4.2 1.8 - 8.0 K/UL    ABS. LYMPHOCYTES 2.1 0.8 - 3.5 K/UL    ABS. MONOCYTES 0.5 0.0 - 1.0 K/UL    ABS. EOSINOPHILS 0.2 0.0 - 0.4 K/UL    ABS. BASOPHILS 0.1 0.0 - 0.1 K/UL    ABS. IMM. GRANS. 0.0 0.00 - 0.04 K/UL    DF AUTOMATED     PROTHROMBIN TIME + INR    Collection Time: 05/04/18  3:16 PM   Result Value Ref Range    INR 3.0 (H) 0.9 - 1.1      Prothrombin time 30.2 (H) 9.0 - 78.9 sec   METABOLIC PANEL, COMPREHENSIVE    Collection Time: 05/04/18  3:16 PM   Result Value Ref Range    Sodium 139 136 - 145 mmol/L    Potassium 3.8 3.5 - 5.1 mmol/L    Chloride 106 97 - 108 mmol/L    CO2 28 21 - 32 mmol/L    Anion gap 5 5 - 15 mmol/L    Glucose 130 (H) 65 - 100 mg/dL    BUN 14 6 - 20 MG/DL    Creatinine 0.58 0.55 - 1.02 MG/DL    BUN/Creatinine ratio 24 (H) 12 - 20      GFR est AA >60 >60 ml/min/1.73m2    GFR est non-AA >60 >60 ml/min/1.73m2    Calcium 9.0 8.5 - 10.1 MG/DL    Bilirubin, total 0.2 0.2 - 1.0 MG/DL    ALT (SGPT) 35 12 - 78 U/L    AST (SGOT) 22 15 - 37 U/L    Alk.  phosphatase 72 45 - 117 U/L    Protein, total 7.3 6.4 - 8.2 g/dL    Albumin 3.8 3.5 - 5.0 g/dL    Globulin 3.5 2.0 - 4.0 g/dL    A-G Ratio 1.1 1.1 - 2.2     CK W/ CKMB & INDEX    Collection Time: 05/04/18  3:16 PM   Result Value Ref Range     26 - 192 U/L    CK - MB 1.4 <3.6 NG/ML    CK-MB Index 1.1 0 - 2.5     TROPONIN I    Collection Time: 05/04/18  3:16 PM   Result Value Ref Range    Troponin-I, Qt. <0.04 <0.05 ng/mL   NT-PRO BNP    Collection Time: 05/04/18  3:16 PM   Result Value Ref Range    NT pro-BNP 44 0 - 125 PG/ML   D DIMER    Collection Time: 05/04/18  3:16 PM   Result Value Ref Range    D-dimer 0.18 0.00 - 0.65 mg/L FEU       Radiologic Studies -   CT Results  (Last 48 hours)               05/04/18 1900  CTA CHEST W OR W WO CONT Final result    Impression:  IMPRESSION: No acute finding or pulmonary embolus. .                           Narrative:  EXAM:  CTA CHEST W OR W WO CONT       INDICATION:   Chest pain, acute, pulmonary embolism (PE) suspected       COMPARISON: None. CONTRAST:  100 mL of Isovue-370. TECHNIQUE:    Precontrast  images were obtained to localize the volume for acquisition. Multislice helical CT arteriography was performed from the diaphragm to the   thoracic inlet during uneventful rapid bolus intravenous contrast   administration. Lung and soft tissue windows were generated. Coronal and   sagittal images were generated and 3D post processing consisting of coronal   maximum intensity images was performed. CT dose reduction was achieved through   use of a standardized protocol tailored for this examination and automatic   exposure control for dose modulation. FINDINGS:   LUNGS:  The lungs are clear of mass, nodule, airspace disease or edema. PLEURA: No pleural effusion or pneumothorax. TRACHEA/BRONCHI: Patent. PULMONARY ARTERIES: The pulmonary arteries are well enhanced and no pulmonary   emboli are identified. MEDIASTINUM/JUNIOR: There is no mediastinal or hilar adenopathy or mass. AORTA: The aorta enhances normally without evidence of aneurysm or dissection.        UPPER ABDOMEN: The visualized portions of the upper abdominal organs are normal.       BONES: No sclerotic or lytic lesion. CXR Results  (Last 48 hours)               05/04/18 1602  XR CHEST PORT Final result    Impression:  IMPRESSION: Normal chest.           Narrative:  EXAM:  XR CHEST PORT. INDICATION: Chest pain. COMPARISON: 3/2/2017. FINDINGS:    A portable AP radiograph of the chest was obtained at 1536 hours. Lines and tubes: The patient is on a cardiac monitor. Lungs: The lungs are clear. Pleura: There is no pneumothorax or pleural effusion. Mediastinum: The cardiac and mediastinal contours and pulmonary vascularity are   normal.   Bones and soft tissues: The bones and soft tissues are grossly within normal   limits. Medical Decision Making   I am the first provider for this patient. I reviewed the vital signs, available nursing notes, past medical history, past surgical history, family history and social history. Vital Signs-Reviewed the patient's vital signs. Patient Vitals for the past 12 hrs:   Temp Pulse Resp BP SpO2   05/04/18 2011 - 81 20 139/73 97 %   05/04/18 2007 98.2 °F (36.8 °C) 79 18 139/73 100 %   05/04/18 1730 - 82 21 128/78 95 %   05/04/18 1454 98.3 °F (36.8 °C) 92 16 160/87 96 %     EKG interpretation: (Preliminary)  15:05  Rhythm: normal sinus rhythm; and regular . Rate (approx.): 92; Axis: normal; IL interval: normal; QRS interval: normal ; ST/T wave: normal; Other findings: normal.  Interpreted by Adilia Tran MD    Records Reviewed: Nursing Notes, Old Medical Records, Previous Radiology Studies and Previous Laboratory Studies    Provider Notes (Medical Decision Making):   DDx: PE, Pleurisy, CAD, CHF, Musculoskeletal pain. ED Course:   Initial assessment performed. The patients presenting problems have been discussed, and they are in agreement with the care plan formulated and outlined with them. I have encouraged them to ask questions as they arise throughout their visit.     PROGRESS NOTE:  5:44 PM  Pt's D-dimer was negative, went in to update her and pt is requesting a CT of her chest despite the negative D-dimer. Will order her CT. Critical Care Time:   None. Disposition:  DISCHARGE NOTE  8:19 PM  The patient has been re-evaluated and is ready for discharge. Reviewed available results with patient. Counseled pt on diagnosis and care plan. Pt has expressed understanding, and all questions have been answered. Pt agrees with plan and agrees to F/U as recommended, or return to the ED if their sxs worsen. Discharge instructions have been provided and explained to the pt, along with reasons to return to the ED. PLAN:  1. Current Discharge Medication List        2. Follow-up Information     Follow up With Details Comments [address], [place] Call in 3 days  [phone] [facility] 800 [facility]      Tan Kingston MD  As needed [address]  [phone]      [facility] EMERGENCY DEPT  If symptoms worsen [address]  [phone]        Return to ED if worse     Diagnosis     Clinical Impression:   1. Acute chest pain    2. Pleurisy        Attestations: This note is prepared by Erich Parham acting as Scribe for MD Natasha Levin MD : The scribe's documentation has been prepared under my direction and personally reviewed by me in its entirety. I confirm that the note above accurately reflects all work, treatment, procedures, and medical decision making performed by me.

## 2018-05-04 NOTE — ED NOTES
Pt returned from CTA at this time. Pt awaiting results for re-eval. Pt. resting comfortably in bed, denies needs at this time. Bed locked and low, call bell in reach.

## 2018-05-04 NOTE — ED NOTES
Bedside and Verbal shift change report given to Eber Thompson RN (oncoming nurse) by Chris Gomez RN (offgoing nurse). Report included the following information SBAR, Kardex, ED Summary, [facility] and Recent Results.

## 2018-05-05 NOTE — DISCHARGE INSTRUCTIONS
Chest Pain: Care Instructions  Your Care Instructions    There are many things that can cause chest pain. Some are not serious and will get better on their own in a few days. But some kinds of chest pain need more testing and treatment. Your doctor may have recommended a follow-up visit in the next 8 to 12 hours. If you are not getting better, you may need more tests or treatment. Even though your doctor has released you, you still need to watch for any problems. The doctor carefully checked you, but sometimes problems can develop later. If you have new symptoms or if your symptoms do not get better, get medical care right away. If you have worse or different chest pain or pressure that lasts more than 5 minutes or you passed out (lost consciousness), call 911 or seek other emergency help right away. A medical visit is only one step in your treatment. Even if you feel better, you still need to do what your doctor recommends, such as going to all suggested follow-up appointments and taking medicines exactly as directed. This will help you recover and help prevent future problems. How can you care for yourself at home? · Rest until you feel better. · Take your medicine exactly as prescribed. Call your doctor if you think you are having a problem with your medicine. · Do not drive after taking a prescription pain medicine. When should you call for help? Call 911 if:  ? · You passed out (lost consciousness). ? · You have severe difficulty breathing. ? · You have symptoms of a heart attack. These may include:  ¨ Chest pain or pressure, or a strange feeling in your chest.  ¨ Sweating. ¨ Shortness of breath. ¨ Nausea or vomiting. ¨ Pain, pressure, or a strange feeling in your back, neck, jaw, or upper belly or in one or both shoulders or arms. ¨ Lightheadedness or sudden weakness. ¨ A fast or irregular heartbeat.   After you call 911, the  may tell you to chew 1 adult-strength or 2 to 4 low-dose aspirin. Wait for an ambulance. Do not try to drive yourself. ?Call your doctor today if:  ? · You have any trouble breathing. ? · Your chest pain gets worse. ? · You are dizzy or lightheaded, or you feel like you may faint. ? · You are not getting better as expected. ? · You are having new or different chest pain. Where can you learn more? Go to http://leah-gen.info/. Enter A120 in the search box to learn more about \"Chest Pain: Care Instructions. \"  Current as of: March 20, 2017  Content Version: 11.4  © 1433-7598 [employer]. Care instructions adapted under license by [employer] (which disclaims liability or warranty for this information). If you have questions about a medical condition or this instruction, always ask your healthcare professional. [address] any warranty or liability for your use of this information. Pleurisy: Care Instructions  Your Care Instructions  Pleurisy is inflammation of the tissue that lines the inside of the chest and covers the lungs (pleura). Pleurisy is often caused by an infection, usually a virus. It also can be caused by other health problems, such as pneumonia or lupus. Pleurisy can cause sharp chest pain that gets worse when you cough or take a deep breath. You may need more tests to find out what is causing your pleurisy. Treatment depends on the cause. Pleurisy may come and go for a few days, or it may continue if the cause has not been treated. Home treatment can help ease symptoms. Follow-up care is a key part of your treatment and safety. Be sure to make and go to all appointments, and call your doctor if you are having problems. It's also a good idea to know your test results and keep a list of the medicines you take. How can you care for yourself at home?   · Take an over-the-counter pain medicine, such as acetaminophen (Tylenol), ibuprofen (Advil, Motrin), or naproxen (Dipak). Read and follow all instructions on the label. · Do not take two or more pain medicines at the same time unless the doctor told you to. Many pain medicines have acetaminophen, which is Tylenol. Too much acetaminophen (Tylenol) can be harmful. · If your doctor prescribed antibiotics, take them as directed. Do not stop taking them just because you feel better. You need to take the full course of antibiotics. · Take cough medicine as directed if your doctor recommends it. · Avoid activities that make the pain worse. When should you call for help? Call 911 anytime you think you may need emergency care. For example, call if:  ? · You have severe trouble breathing. ? · You have severe chest pain. ? · You passed out (lost consciousness). ?Call your doctor now or seek immediate medical care if:  ? · You have a new or higher fever. ? Watch closely for changes in your health, and be sure to contact your doctor if:  ? · You begin to cough up yellow or green mucus. ? · You cough up blood. ? · Your symptoms are not better in 3 or 4 days. Where can you learn more? Go to http://leah-gen.info/. Enter F346 in the search box to learn more about \"Pleurisy: Care Instructions. \"  Current as of: May 12, 2017  Content Version: 11.4  © 2765-6957 [employer]. Care instructions adapted under license by [employer] (which disclaims liability or warranty for this information). If you have questions about a medical condition or this instruction, always ask your healthcare professional. [address] any warranty or liability for your use of this information. Thank you! Thank you for allowing us to provide you with excellent care today. We hope we addressed all of your concerns and needs. We strive to provide excellent quality care in the Emergency Department. You may receive a survey after your visit to evaluate the care you were provided. Should you receive a survey from us, we invite you to share your experience and tell us what made it excellent. It was a pleasure serving you, we invite you to share your experience with us, in our pursuit for excellence, should you be selected to receive a survey. If you feel that you have not received excellent quality care or timely care, please ask to speak to the nurse manager. Please choose us in the future for your continued health care needs. ------------------------------------------------------------------------------------------------------------  The exam and treatment you received in the Emergency Department were for an urgent problem and are not intended as complete care. It is important that you follow up with a doctor, nurse practitioner, or physician assistant for ongoing care. If your symptoms become worse or you do not improve as expected and you are unable to reach your usual health care provider, you should return to the Emergency Department. We are available 24 hours a day. Please take your discharge instructions with you when you go to your follow-up appointment. If you have any problem arranging a follow-up appointment, contact the Emergency Department immediately. If a prescription has been provided, please have it filled as soon as possible to prevent a delay in treatment. Read the entire medication instruction sheet provided to you by the pharmacy. If you have any questions or reservations about taking the medication due to side effects or interactions with other medications, please call your primary care physician or contact the ER to speak with the charge nurse. Make an appointment with your family doctor or the physician you were referred to for follow-up of this visit as instructed on your discharge paperwork, as this is mandatory follow-up. Return to the ER if you are unable to be seen or if you are unable to be seen in a timely manner.     If you have any problem arranging the follow-up visit, contact the Emergency Department immediately.

## 2018-05-05 NOTE — ED NOTES
Pt. Resting comfortably in bed, denies needs at this time. Bed locked and low, call bell in reach. Pt awaiting re-eval. Dr Aubrey Cleveland now at bedside.

## 2018-05-05 NOTE — ED NOTES
Pt received discharge instructions from Dr. Mell Christian and verbalized understanding. Pt ambulatory to exit with a steady gait.

## 2018-05-06 LAB
ATRIAL RATE: 92 BPM
CALCULATED P AXIS, ECG09: 62 DEGREES
CALCULATED R AXIS, ECG10: 23 DEGREES
CALCULATED T AXIS, ECG11: 55 DEGREES
DIAGNOSIS, 93000: NORMAL
P-R INTERVAL, ECG05: 144 MS
Q-T INTERVAL, ECG07: 368 MS
QRS DURATION, ECG06: 86 MS
QTC CALCULATION (BEZET), ECG08: 455 MS
VENTRICULAR RATE, ECG03: 92 BPM

## 2020-09-15 ENCOUNTER — HOSPITAL ENCOUNTER (OUTPATIENT)
Dept: MRI IMAGING | Age: 60
Discharge: HOME | End: 2020-09-15
Attending: OTOLARYNGOLOGY
Payer: COMMERCIAL

## 2020-09-15 DIAGNOSIS — H90.72: ICD-10-CM

## 2020-09-15 DIAGNOSIS — H93.12 TINNITUS, LEFT: ICD-10-CM

## 2020-09-15 PROCEDURE — 70553 MRI BRAIN STEM W/O & W/DYE: CPT

## 2020-09-15 PROCEDURE — 74011250636 HC RX REV CODE- 250/636: Performed by: OTOLARYNGOLOGY

## 2020-09-15 PROCEDURE — A9575 INJ GADOTERATE MEGLUMI 0.1ML: HCPCS | Performed by: OTOLARYNGOLOGY

## 2020-09-15 RX ORDER — GADOTERATE MEGLUMINE 376.9 MG/ML
19 INJECTION INTRAVENOUS
Status: COMPLETED | OUTPATIENT
Start: 2020-09-15 | End: 2020-09-15

## 2020-09-15 RX ADMIN — GADOTERATE MEGLUMINE 19 ML: 376.9 INJECTION INTRAVENOUS at 16:00

## 2020-12-10 ENCOUNTER — TRANSCRIBE ORDER (OUTPATIENT)
Dept: SCHEDULING | Age: 60
End: 2020-12-10

## 2020-12-10 DIAGNOSIS — H90.12 CONDUCTIVE HEARING LOSS IN LEFT EAR: Primary | ICD-10-CM

## 2021-03-09 ENCOUNTER — OFFICE VISIT (OUTPATIENT)
Dept: NEUROLOGY | Age: 61
End: 2021-03-09
Payer: COMMERCIAL

## 2021-03-09 VITALS
OXYGEN SATURATION: 97 % | HEART RATE: 94 BPM | WEIGHT: 213 LBS | HEIGHT: 65 IN | BODY MASS INDEX: 35.49 KG/M2 | SYSTOLIC BLOOD PRESSURE: 144 MMHG | DIASTOLIC BLOOD PRESSURE: 82 MMHG

## 2021-03-09 DIAGNOSIS — H93.A2 PULSATILE TINNITUS OF LEFT EAR: Primary | ICD-10-CM

## 2021-03-09 PROCEDURE — 99204 OFFICE O/P NEW MOD 45 MIN: CPT | Performed by: PSYCHIATRY & NEUROLOGY

## 2021-03-09 RX ORDER — FLUOXETINE HYDROCHLORIDE 20 MG/1
CAPSULE ORAL
COMMUNITY
Start: 2021-03-04

## 2021-03-09 RX ORDER — AMITRIPTYLINE HYDROCHLORIDE 25 MG/1
TABLET, FILM COATED ORAL
COMMUNITY
Start: 2020-12-09

## 2021-03-09 RX ORDER — CYCLOSPORINE 0.5 MG/ML
EMULSION OPHTHALMIC
COMMUNITY
Start: 2021-03-05

## 2021-03-09 NOTE — PROGRESS NOTES
Chief Complaint   Patient presents with    Neurologic Problem     \" I am referred by Dr. Carmen Mcintosh, for persistent tinnitus in my left ear, that has effected my hearing. \"       Referred by: Dr. Carmen Mcintosh      HPI    Mrs. Marlon Hodgson is a 82-year-old woman with hypertension and a history of pulmonary emboli here because of changes to the left ear. She was referred by Dr. 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 She was referred to me for additional work-up out of concern for possible intracranial hypertension. Mrs. Marlon Hodgson tells me since July 2020 she had sudden onset tinnitus in the left ear. Questionable pulsatile nature. It is constant ever since then. No headaches. No vision changes. No numbness or weakness. She had an MRI of the brain with IACs normal.  Symptoms are nonpositional.  It is affecting her sleep now. She is overweight with difficulty maintaining weight loss. Review of Systems   HENT: Positive for hearing loss and tinnitus. Eyes: Negative for double vision. Neurological: Negative for headaches. All other systems reviewed and are negative. Past Medical History:   Diagnosis Date    Hypertension     Thromboembolus [facility])     pulmonary emboli bilaterally     No family history on file.   Social History     Socioeconomic History    Marital status:      Spouse name: Not on file    Number of children: Not on file    Years of education: Not on file    Highest education level: Not on file   Occupational History    Not on file   Social Needs    Financial resource strain: Not on file    Food insecurity     Worry: Not on file     Inability: Not on file    Transportation needs     Medical: Not on file     Non-medical: Not on file   Tobacco Use    Smoking status: Former Smoker     Packs/day: 1.50     Years: 13.00     Pack years: 19.50    Smokeless tobacco: Never Used   Substance and Sexual Activity    Alcohol use: No    Drug use: No    Sexual activity: Not on file   Lifestyle    Physical activity     Days per week: Not on file     Minutes per session: Not on file    Stress: Not on file   Relationships    Social connections     Talks on phone: Not on file     Gets together: Not on file     Attends Sikhism service: Not on file     Active member of club or organization: Not on file     Attends meetings of clubs or organizations: Not on file     Relationship status: Not on file    Intimate partner violence     Fear of current or ex partner: Not on file     Emotionally abused: Not on file     Physically abused: Not on file     Forced sexual activity: Not on file   Other Topics Concern    Not on file   Social History Narrative    Not on file     Current Outpatient Medications   Medication Sig    Restasis 0.05 % dpet     FLUoxetine (PROzac) 20 mg capsule     warfarin (COUMADIN) 5 mg tablet Take 10 mg by mouth daily.  lisinopril (PRINIVIL, ZESTRIL) 5 mg tablet Take 5 mg by mouth two (2) times a day.  ezetimibe (ZETIA) 10 mg tablet Take 10 mg by mouth daily.  VIT C/E/ZN/COPPR/LUTEIN/ZEAXAN (PRESERVISION AREDS 2 PO) Take  by mouth daily.  cyanocobalamin (VITAMIN B12) 500 mcg tablet Take 500 mcg by mouth daily.  cholecalciferol (VITAMIN D3) 1,000 unit tablet Take 1,000 Units by mouth daily.  amitriptyline (ELAVIL) 25 mg tablet     ondansetron (ZOFRAN ODT) 4 mg disintegrating tablet Take 1 Tab by mouth every eight (8) hours as needed for Nausea.  promethazine (PHENERGAN) 25 mg suppository Insert 1 Suppository into rectum every six (6) hours as needed for Nausea.  calcium carbonate-vitamin D3 (CALCIUM PETITES) 200 (500)-400 mg-unit cap Take 200 mg by mouth three (3) times daily. No current facility-administered medications for this visit. Allergies   Allergen Reactions    Multivitamin With Iron Rash    Niacin Itching         Neurologic Exam     Mental Status   Oriented to person, place, and time.      Cranial Nerves   Cranial nerves II through XII intact. Hearing impaired mildly     Motor Exam   Muscle bulk: normal    Strength   Strength 5/5 throughout. Sensory Exam   Light touch normal.     Gait, Coordination, and Reflexes     Gait  Gait: normal    Coordination   Finger to nose coordination: normal    Physical Exam   Constitutional: She is oriented to person, place, and time. She appears well-developed and well-nourished. Cardiovascular: Normal rate. Pulmonary/Chest: Effort normal.   Neurological: She is oriented to person, place, and time. She has normal strength. She has a normal Finger-Nose-Finger Test. Gait normal.   Skin: Skin is warm and dry. Psychiatric: Her behavior is normal.   Vitals reviewed. Visit Vitals  BP (!) 144/82 (BP 1 Location: Left upper arm, BP Patient Position: Sitting)   Pulse 94   Ht 5' 5\" (1.651 m)   Wt 213 lb (96.6 kg)   SpO2 97%   BMI 35.45 kg/m²            CT Results (maximum last 3): Results from Hospital Encounter encounter on 05/04/18   CTA CHEST W OR W WO CONT    Narrative EXAM:  CTA CHEST W OR W WO CONT    INDICATION:   Chest pain, acute, pulmonary embolism (PE) suspected    COMPARISON: None. CONTRAST:  100 mL of Isovue-370. TECHNIQUE:   Precontrast  images were obtained to localize the volume for acquisition. Multislice helical CT arteriography was performed from the diaphragm to the  thoracic inlet during uneventful rapid bolus intravenous contrast  administration. Lung and soft tissue windows were generated. Coronal and  sagittal images were generated and 3D post processing consisting of coronal  maximum intensity images was performed. CT dose reduction was achieved through  use of a standardized protocol tailored for this examination and automatic  exposure control for dose modulation. FINDINGS:  LUNGS:  The lungs are clear of mass, nodule, airspace disease or edema. PLEURA: No pleural effusion or pneumothorax. TRACHEA/BRONCHI: Patent.     PULMONARY ARTERIES: The pulmonary arteries are well enhanced and no pulmonary  emboli are identified. MEDIASTINUM/JUNIOR: There is no mediastinal or hilar adenopathy or mass. AORTA: The aorta enhances normally without evidence of aneurysm or dissection. UPPER ABDOMEN: The visualized portions of the upper abdominal organs are normal.    BONES: No sclerotic or lytic lesion. Impression IMPRESSION: No acute finding or pulmonary embolus. Martha Davila MRI Results (maximum last 3): Results from [place] encounter on 09/15/20   MRI IAC W WO CONT    Narrative Clinical indication: Left side sensorineural hearing loss. Technical factors: Diffusion imaging, sagittal T1-weighted, axial load  T1-weighted pre and post 19 cc IV Dotarem, T2-weighted FLAIR axial gradient echo  temporal bone axial and coronal T1-weighted pre and postcontrast temporal bone. No prior. Diffusion imaging does not show acute ischemic changes. There is no extra-axial fluid collection hemorrhage or shift. Flow voids in major vessels are present at the base of the skull. There is no white matter disease. Ventricles are normal in size. There is no intracranial lesion no enhancing lesion. Special attention to the temporal bones show no fluid collection masses or  enhancing lesion. Impression IMPRESSION: Negative examination. PET Results (maximum last 3): No results found for this or any previous visit. Assessment and Plan   Diagnoses and all orders for this visit:    1. Pulsatile tinnitus of left ear  -     MRI BRAIN MRV WO CONT; Future      17-year-old woman with hypertension who has what seems to be left pulsatile tinnitus of unclear origin. Main issue is to rule out any type of venous sinus thrombosis or occlusive process that could be contributing to the increased intracranial pressure which could account for the symptom presentation. I am going to check an MRV of the brain.   Brain parenchyma itself unrevealing and normal.  Need to rule out any venous sinus stenosis. No red flags on exam.  We will let her know the results once I have them and if there is evidence of such I will refer her appropriately. We talked about the anatomy of the venous sinuses in detail. She understands the plan. Continue to follow-up with ENT as well. I reviewed and decided to continue the current medications. This clinical note was dictated with an electronic dictation software that can make unintentional errors. If there are any questions, please contact me directly for clarification. A notice of this visit/encounter being completed has been sent electronically to the patient's PCP and/or referring provider.      2 [facility], [facility] [address]  Diplomate ELIANEN

## 2021-03-09 NOTE — PROGRESS NOTES
Chief Complaint   Patient presents with    Neurologic Problem     \" I am referred by Dr. Casey Man, for persistent tinnitus in my left ear, that has effected my hearing. \"     Visit Vitals  BP (!) 144/82 (BP 1 Location: Left upper arm, BP Patient Position: Sitting)   Pulse 94   Ht 5' 5\" (1.651 m)   Wt 213 lb (96.6 kg)   SpO2 97%   BMI 35.45 kg/m²

## 2021-03-24 ENCOUNTER — HOSPITAL ENCOUNTER (OUTPATIENT)
Dept: MRI IMAGING | Age: 61
Discharge: HOME | End: 2021-03-24
Attending: PSYCHIATRY & NEUROLOGY
Payer: COMMERCIAL

## 2021-03-24 DIAGNOSIS — H93.A2 PULSATILE TINNITUS OF LEFT EAR: ICD-10-CM

## 2021-03-24 PROCEDURE — 70551 MRI BRAIN STEM W/O DYE: CPT | Performed by: PSYCHIATRY & NEUROLOGY

## 2021-03-24 PROCEDURE — 70544 MR ANGIOGRAPHY HEAD W/O DYE: CPT | Performed by: PSYCHIATRY & NEUROLOGY

## 2021-03-25 NOTE — PROGRESS NOTES
Imaging looks good. No issue with the veins within the brain that could be causing the whooshing sound in his ear. Continue ENT if not already.

## 2021-04-07 ENCOUNTER — TELEPHONE (OUTPATIENT)
Dept: NEUROLOGY | Age: 61
End: 2021-04-07

## 2021-04-07 NOTE — TELEPHONE ENCOUNTER
Patient called, verified and notified of imaging results. Will fax over results to Dr. Tanya Urena as well.

## 2021-04-07 NOTE — TELEPHONE ENCOUNTER
Pt calling stating she never received a call re her MRI results. Please call    She is also requesting those results be sent to Dr. Jovanny Delaney

## 2022-03-15 LAB
ALBUMIN SERPL-MCNC: 4 G/DL (ref 3.5–5)
ALBUMIN/GLOB SERPL: 1.1 {RATIO} (ref 1.1–2.2)
ALP SERPL-CCNC: 55 U/L (ref 45–117)
ALT SERPL-CCNC: 28 U/L (ref 12–78)
ANION GAP SERPL CALC-SCNC: 4 MMOL/L (ref 5–15)
AST SERPL-CCNC: 20 U/L (ref 15–37)
BASOPHILS # BLD: 0 K/UL (ref 0–0.1)
BASOPHILS NFR BLD: 1 % (ref 0–1)
BILIRUB SERPL-MCNC: 0.2 MG/DL (ref 0.2–1)
BNP SERPL-MCNC: 48 PG/ML
BUN SERPL-MCNC: 21 MG/DL (ref 6–20)
BUN/CREAT SERPL: 40 (ref 12–20)
CALCIUM SERPL-MCNC: 9.3 MG/DL (ref 8.5–10.1)
CHLORIDE SERPL-SCNC: 103 MMOL/L (ref 97–108)
CO2 SERPL-SCNC: 28 MMOL/L (ref 21–32)
CREAT SERPL-MCNC: 0.53 MG/DL (ref 0.55–1.02)
DIFFERENTIAL METHOD BLD: NORMAL
EOSINOPHIL # BLD: 0.1 K/UL (ref 0–0.4)
EOSINOPHIL NFR BLD: 2 % (ref 0–7)
ERYTHROCYTE [DISTWIDTH] IN BLOOD BY AUTOMATED COUNT: 13.2 % (ref 11.5–14.5)
GLOBULIN SER CALC-MCNC: 3.5 G/DL (ref 2–4)
GLUCOSE SERPL-MCNC: 97 MG/DL (ref 65–100)
HCT VFR BLD AUTO: 37.7 % (ref 35–47)
HGB BLD-MCNC: 12.5 G/DL (ref 11.5–16)
IMM GRANULOCYTES # BLD AUTO: 0 K/UL (ref 0–0.04)
IMM GRANULOCYTES NFR BLD AUTO: 0 % (ref 0–0.5)
LYMPHOCYTES # BLD: 3.4 K/UL (ref 0.8–3.5)
LYMPHOCYTES NFR BLD: 45 % (ref 12–49)
MCH RBC QN AUTO: 30.2 PG (ref 26–34)
MCHC RBC AUTO-ENTMCNC: 33.2 G/DL (ref 30–36.5)
MCV RBC AUTO: 91.1 FL (ref 80–99)
MONOCYTES # BLD: 0.6 K/UL (ref 0–1)
MONOCYTES NFR BLD: 8 % (ref 5–13)
NEUTS SEG # BLD: 3.5 K/UL (ref 1.8–8)
NEUTS SEG NFR BLD: 44 % (ref 32–75)
NRBC # BLD: 0 K/UL (ref 0–0.01)
NRBC BLD-RTO: 0 PER 100 WBC
PLATELET # BLD AUTO: 276 K/UL (ref 150–400)
PMV BLD AUTO: 10.3 FL (ref 8.9–12.9)
POTASSIUM SERPL-SCNC: 3.7 MMOL/L (ref 3.5–5.1)
PROT SERPL-MCNC: 7.5 G/DL (ref 6.4–8.2)
RBC # BLD AUTO: 4.14 M/UL (ref 3.8–5.2)
SODIUM SERPL-SCNC: 135 MMOL/L (ref 136–145)
TROPONIN-HIGH SENSITIVITY: 4 NG/L (ref 0–51)
WBC # BLD AUTO: 7.7 K/UL (ref 3.6–11)

## 2022-03-15 PROCEDURE — 84484 ASSAY OF TROPONIN QUANT: CPT

## 2022-03-15 PROCEDURE — 99285 EMERGENCY DEPT VISIT HI MDM: CPT

## 2022-03-15 PROCEDURE — 93005 ELECTROCARDIOGRAM TRACING: CPT

## 2022-03-15 PROCEDURE — 36415 COLL VENOUS BLD VENIPUNCTURE: CPT

## 2022-03-15 PROCEDURE — 80053 COMPREHEN METABOLIC PANEL: CPT

## 2022-03-15 PROCEDURE — 85025 COMPLETE CBC W/AUTO DIFF WBC: CPT

## 2022-03-15 PROCEDURE — 83880 ASSAY OF NATRIURETIC PEPTIDE: CPT

## 2022-03-16 ENCOUNTER — APPOINTMENT (OUTPATIENT)
Dept: CT IMAGING | Age: 62
End: 2022-03-16
Attending: EMERGENCY MEDICINE
Payer: COMMERCIAL

## 2022-03-16 ENCOUNTER — HOSPITAL ENCOUNTER (EMERGENCY)
Age: 62
Discharge: HOME | End: 2022-03-16
Attending: EMERGENCY MEDICINE | Admitting: EMERGENCY MEDICINE
Payer: COMMERCIAL

## 2022-03-16 ENCOUNTER — HOSPITAL ENCOUNTER (OUTPATIENT)
Dept: GENERAL RADIOLOGY | Age: 62
Discharge: HOME | End: 2022-03-16
Attending: EMERGENCY MEDICINE

## 2022-03-16 VITALS
HEIGHT: 65 IN | TEMPERATURE: 98.4 F | OXYGEN SATURATION: 96 % | DIASTOLIC BLOOD PRESSURE: 61 MMHG | BODY MASS INDEX: 26.67 KG/M2 | WEIGHT: 160.05 LBS | RESPIRATION RATE: 17 BRPM | HEART RATE: 60 BPM | SYSTOLIC BLOOD PRESSURE: 99 MMHG

## 2022-03-16 DIAGNOSIS — R03.0 ELEVATED BLOOD PRESSURE READING: ICD-10-CM

## 2022-03-16 DIAGNOSIS — R07.89 ATYPICAL CHEST PAIN: Primary | ICD-10-CM

## 2022-03-16 LAB
ATRIAL RATE: 72 BPM
CALCULATED P AXIS, ECG09: 55 DEGREES
CALCULATED R AXIS, ECG10: 13 DEGREES
CALCULATED T AXIS, ECG11: 52 DEGREES
COMMENT, HOLDF: NORMAL
D DIMER PPP FEU-MCNC: 0.88 MG/L FEU (ref 0–0.65)
DIAGNOSIS, 93000: NORMAL
INR PPP: 1.7 (ref 0.9–1.1)
LIPASE SERPL-CCNC: 298 U/L (ref 73–393)
P-R INTERVAL, ECG05: 156 MS
PROTHROMBIN TIME: 17.1 SEC (ref 9–11.1)
Q-T INTERVAL, ECG07: 398 MS
QRS DURATION, ECG06: 90 MS
QTC CALCULATION (BEZET), ECG08: 435 MS
SAMPLES BEING HELD,HOLD: NORMAL
TROPONIN-HIGH SENSITIVITY: 22 NG/L (ref 0–51)
TROPONIN-HIGH SENSITIVITY: 24 NG/L (ref 0–51)
VENTRICULAR RATE, ECG03: 72 BPM

## 2022-03-16 PROCEDURE — 84484 ASSAY OF TROPONIN QUANT: CPT

## 2022-03-16 PROCEDURE — 83690 ASSAY OF LIPASE: CPT

## 2022-03-16 PROCEDURE — 71045 X-RAY EXAM CHEST 1 VIEW: CPT

## 2022-03-16 PROCEDURE — 36415 COLL VENOUS BLD VENIPUNCTURE: CPT

## 2022-03-16 PROCEDURE — 74011250637 HC RX REV CODE- 250/637: Performed by: EMERGENCY MEDICINE

## 2022-03-16 PROCEDURE — 85379 FIBRIN DEGRADATION QUANT: CPT

## 2022-03-16 PROCEDURE — 74011000636 HC RX REV CODE- 636: Performed by: EMERGENCY MEDICINE

## 2022-03-16 PROCEDURE — 71275 CT ANGIOGRAPHY CHEST: CPT

## 2022-03-16 PROCEDURE — 85610 PROTHROMBIN TIME: CPT

## 2022-03-16 RX ORDER — CLONIDINE HYDROCHLORIDE 0.1 MG/1
0.1 TABLET ORAL AS NEEDED
Qty: 10 TABLET | Refills: 0 | Status: SHIPPED | OUTPATIENT
Start: 2022-03-16

## 2022-03-16 RX ORDER — CLONIDINE HYDROCHLORIDE 0.1 MG/1
0.1 TABLET ORAL
Status: COMPLETED | OUTPATIENT
Start: 2022-03-16 | End: 2022-03-16

## 2022-03-16 RX ADMIN — CLONIDINE HYDROCHLORIDE 0.1 MG: 0.1 TABLET ORAL at 01:49

## 2022-03-16 RX ADMIN — IOPAMIDOL 65 ML: 755 INJECTION, SOLUTION INTRAVENOUS at 06:45

## 2022-03-16 NOTE — ED PROVIDER NOTES
EMERGENCY DEPARTMENT HISTORY AND PHYSICAL EXAM     ----------------------------------------------------------------------------  Please note that this dictation was completed with [employer], the computer voice recognition software. Quite often unanticipated grammatical, syntax, homophones, and other interpretive errors are inadvertently transcribed by the computer software. Please disregard these errors. Please excuse any errors that have escaped final proofreading  ----------------------------------------------------------------------------      Date: 3/16/2022  Patient Name: Audrey Rivers    History of Presenting Illness     Chief Complaint   Patient presents with    Palpitations     x 2 hrs at home, unrelived by OTC antacids, reports BP high at home 198/94, pt took it 6 times in 30 minutes and came to ER       History Provided By:  Patient    HPI: Audrey Rivers is a 63 y.o. female, with significant pmhx of hypertension, bilateral PEs who presents via private vehicle to the ED with c/o chest pressure and elevated blood pressure readings at home earlier this evening. Notes that she thought she had indigestion due to overeating from dinner. Took some Tums and drink ginger ale causing her to let out a large belch which normally makes her feel better but noted to have continued chest pressure. Patient noted that she decided to take her blood pressure and was having readings in the high 048R systolically. Notes that she took her blood pressure many more times over the next half hour with continued elevated numbers. Decided to take additional lisinopril 5 mg that she normally only takes in the morning. Notes that while she is awaiting to be seen here in the emergency department her chest pressure symptoms have improved although not completely gone as has her blood pressure although not at her baseline 110-115.   Patient  specifically denies any associated fevers, chills, nausea, vomiting, diarrhea, abd pain, changes in BM, urinary sxs, or headache. Social Hx: denies tobacco  denies EtOH , denies recreational/Illicit Drugs    There are no other complaints, changes, or physical findings at this time. PCP: Dedra Mayorga MD    Allergies   Allergen Reactions    Multivitamin With Iron Rash    Niacin Itching       Current Outpatient Medications   Medication Sig Dispense Refill    cloNIDine HCL (CATAPRES) 0.1 mg tablet Take 1 Tablet by mouth as needed (hypertension). 10 Tablet 0    amitriptyline (ELAVIL) 25 mg tablet       Restasis 0.05 % dpet       FLUoxetine (PROzac) 20 mg capsule       ondansetron (ZOFRAN ODT) 4 mg disintegrating tablet Take 1 Tab by mouth every eight (8) hours as needed for Nausea. 10 Tab 0    promethazine (PHENERGAN) 25 mg suppository Insert 1 Suppository into rectum every six (6) hours as needed for Nausea. 12 Suppository 0    warfarin (COUMADIN) 5 mg tablet Take 10 mg by mouth daily.  lisinopril (PRINIVIL, ZESTRIL) 5 mg tablet Take 5 mg by mouth two (2) times a day.  ezetimibe (ZETIA) 10 mg tablet Take 10 mg by mouth daily.  VIT C/E/ZN/COPPR/LUTEIN/ZEAXAN (PRESERVISION AREDS 2 PO) Take  by mouth daily.  cyanocobalamin (VITAMIN B12) 500 mcg tablet Take 500 mcg by mouth daily.  calcium carbonate-vitamin D3 (CALCIUM PETITES) 200 (500)-400 mg-unit cap Take 200 mg by mouth three (3) times daily.  cholecalciferol (VITAMIN D3) 1,000 unit tablet Take 1,000 Units by mouth daily. Past History     Past Medical History:  Past Medical History:   Diagnosis Date    Hypertension     Thromboembolus ([address]     pulmonary emboli bilaterally       Past Surgical History:  Past Surgical History:   Procedure Laterality Date    HX ORTHOPAEDIC      \"hammer toe surgery\", carpal tunnel bilateral hands, and spinal effusion       Family History:  No family history on file.     Social History:  Social History     Tobacco Use    Smoking status: Former Smoker Packs/day: 1.50     Years: 13.00     Pack years: 19.50    Smokeless tobacco: Never Used   Substance Use Topics    Alcohol use: No    Drug use: No       Allergies: Allergies   Allergen Reactions    Multivitamin With Iron Rash    Niacin Itching         Review of Systems   Review of Systems   Constitutional: Negative. Negative for fever. Eyes: Negative. Respiratory: Positive for chest tightness. Negative for shortness of breath. Cardiovascular: Positive for chest pain. Gastrointestinal: Negative for abdominal pain, nausea and vomiting. Endocrine: Negative. Genitourinary: Negative. Negative for difficulty urinating, dysuria and hematuria. Musculoskeletal: Negative. Skin: Negative. Neurological: Negative. Psychiatric/Behavioral: Negative for suicidal ideas. All other systems reviewed and are negative. Physical Exam   Physical Exam  Vitals and nursing note reviewed. Constitutional:       General: She is not in acute distress. Appearance: She is well-developed. She is not diaphoretic. HENT:      Head: Normocephalic and atraumatic. Nose: Nose normal.   Eyes:      General: No scleral icterus. Conjunctiva/sclera: Conjunctivae normal.   Neck:      Trachea: No tracheal deviation. Cardiovascular:      Rate and Rhythm: Normal rate and regular rhythm. Heart sounds: Normal heart sounds. No murmur heard. No friction rub. Pulmonary:      Effort: Pulmonary effort is normal. No respiratory distress. Breath sounds: Normal breath sounds. No stridor. No wheezing or rales. Chest:       Abdominal:      General: Bowel sounds are normal. There is no distension. Palpations: Abdomen is soft. Tenderness: There is no abdominal tenderness. There is no rebound. Musculoskeletal:         General: No tenderness. Normal range of motion. Cervical back: Normal range of motion. Skin:     General: Skin is warm and dry. Findings: No rash.    Neurological: Mental Status: She is alert and oriented to person, place, and time. Cranial Nerves: No cranial nerve deficit. Psychiatric:         Speech: Speech normal.         Behavior: Behavior normal.         Thought Content: Thought content normal.         Judgment: Judgment normal.           Diagnostic Study Results     Labs -     Recent Results (from the past 12 hour(s))   EKG, 12 LEAD, INITIAL    Collection Time: 03/15/22 10:29 PM   Result Value Ref Range    Ventricular Rate 72 BPM    Atrial Rate 72 BPM    P-R Interval 156 ms    QRS Duration 90 ms    Q-T Interval 398 ms    QTC Calculation (Bezet) 435 ms    Calculated P Axis 55 degrees    Calculated R Axis 13 degrees    Calculated T Axis 52 degrees    Diagnosis       Normal sinus rhythm  Minimal voltage criteria for LVH, may be normal variant  No previous ECGs available     CBC WITH AUTOMATED DIFF    Collection Time: 03/15/22 10:34 PM   Result Value Ref Range    WBC 7.7 3.6 - 11.0 K/uL    RBC 4.14 3.80 - 5.20 M/uL    HGB 12.5 11.5 - 16.0 g/dL    HCT 37.7 35.0 - 47.0 %    MCV 91.1 80.0 - 99.0 FL    MCH 30.2 26.0 - 34.0 PG    MCHC 33.2 30.0 - 36.5 g/dL    RDW 13.2 11.5 - 14.5 %    PLATELET 151 526 - 410 K/uL    MPV 10.3 8.9 - 12.9 FL    NRBC 0.0 0  WBC    ABSOLUTE NRBC 0.00 0.00 - 0.01 K/uL    NEUTROPHILS 44 32 - 75 %    LYMPHOCYTES 45 12 - 49 %    MONOCYTES 8 5 - 13 %    EOSINOPHILS 2 0 - 7 %    BASOPHILS 1 0 - 1 %    IMMATURE GRANULOCYTES 0 0.0 - 0.5 %    ABS. NEUTROPHILS 3.5 1.8 - 8.0 K/UL    ABS. LYMPHOCYTES 3.4 0.8 - 3.5 K/UL    ABS. MONOCYTES 0.6 0.0 - 1.0 K/UL    ABS. EOSINOPHILS 0.1 0.0 - 0.4 K/UL    ABS. BASOPHILS 0.0 0.0 - 0.1 K/UL    ABS. IMM.  GRANS. 0.0 0.00 - 0.04 K/UL    DF AUTOMATED     METABOLIC PANEL, COMPREHENSIVE    Collection Time: 03/15/22 10:34 PM   Result Value Ref Range    Sodium 135 (L) 136 - 145 mmol/L    Potassium 3.7 3.5 - 5.1 mmol/L    Chloride 103 97 - 108 mmol/L    CO2 28 21 - 32 mmol/L    Anion gap 4 (L) 5 - 15 mmol/L    Glucose 97 65 - 100 mg/dL    BUN 21 (H) 6 - 20 MG/DL    Creatinine 0.53 (L) 0.55 - 1.02 MG/DL    BUN/Creatinine ratio 40 (H) 12 - 20      GFR est AA >60 >60 ml/min/1.73m2    GFR est non-AA >60 >60 ml/min/1.73m2    Calcium 9.3 8.5 - 10.1 MG/DL    Bilirubin, total 0.2 0.2 - 1.0 MG/DL    ALT (SGPT) 28 12 - 78 U/L    AST (SGOT) 20 15 - 37 U/L    Alk. phosphatase 55 45 - 117 U/L    Protein, total 7.5 6.4 - 8.2 g/dL    Albumin 4.0 3.5 - 5.0 g/dL    Globulin 3.5 2.0 - 4.0 g/dL    A-G Ratio 1.1 1.1 - 2.2     NT-PRO BNP    Collection Time: 03/15/22 10:34 PM   Result Value Ref Range    NT pro-BNP 48 <125 PG/ML   TROPONIN-HIGH SENSITIVITY    Collection Time: 03/15/22 10:34 PM   Result Value Ref Range    Troponin-High Sensitivity 4 0 - 51 ng/L   TROPONIN-HIGH SENSITIVITY    Collection Time: 03/16/22  2:40 AM   Result Value Ref Range    Troponin-High Sensitivity 24 0 - 51 ng/L   LIPASE    Collection Time: 03/16/22  2:40 AM   Result Value Ref Range    Lipase 298 73 - 393 U/L   SAMPLES BEING HELD    Collection Time: 03/16/22  2:40 AM   Result Value Ref Range    SAMPLES BEING HELD PST LV BL     COMMENT        Add-on orders for these samples will be processed based on acceptable specimen integrity and analyte stability, which may vary by analyte. D DIMER    Collection Time: 03/16/22  2:40 AM   Result Value Ref Range    D-dimer 0.88 (H) 0.00 - 0.65 mg/L FEU   SAMPLES BEING HELD    Collection Time: 03/16/22  2:40 AM   Result Value Ref Range    SAMPLES BEING HELD LV     COMMENT        Add-on orders for these samples will be processed based on acceptable specimen integrity and analyte stability, which may vary by analyte. Radiologic Studies -   XR CHEST PORT   Final Result   No acute process identified. CTA CHEST W OR W WO CONT    (Results Pending)     CT Results  (Last 48 hours)    None        CXR Results  (Last 48 hours)               03/16/22 0059  XR CHEST PORT Final result    Impression:  No acute process identified. Narrative:  Clinical history: chest pain   INDICATION:   chest pain   COMPARISON: 2018       FINDINGS:   AP portable upright view of the chest demonstrates a stable  cardiopericardial   silhouette. There is no pleural effusion. .There is no focal consolidation. .There   is no pneumothorax. . Patient is on a cardiac monitor. Medical Decision Making   I am the first provider for this patient. I reviewed the vital signs, available nursing notes, past medical history, past surgical history, family history and social history. Vital Signs-Reviewed the patient's vital signs. Patient Vitals for the past 12 hrs:   Temp Pulse Resp BP SpO2   03/16/22 0236 -- 81 26 130/73 98 %   03/16/22 0206 -- 81 30 (!) 141/81 96 %   03/16/22 0136 -- 74 18 (!) 145/82 97 %   03/16/22 0106 -- 72 20 (!) 144/74 96 %   03/16/22 0040 -- -- -- -- 98 %   03/16/22 0039 -- -- -- -- 98 %   03/16/22 0036 -- -- -- (!) 145/83 --   03/15/22 2221 98.4 °F (36.9 °C) 66 18 (!) 188/91 100 %       Pulse Oximetry Analysis - 98% on RA, normal  Rate: 66 bpm  Rhythm: nsr      Provider Notes (Medical Decision Making):     DDX:  Arrhythmia, reflux, pancreatitis, accelerated hypertension    Plan:  EKG, labs, GI cocktail, troponin x2    Impression:  Atypical chest pain, accelerated hypertension    ED Course:   Initial assessment performed. The patients presenting problems have been discussed, and they are in agreement with the care plan formulated and outlined with them. I have encouraged them to ask questions as they arise throughout their visit. I reviewed the nursing notes and and vital signs from today's visit, as well as the electronic medical record system for any past medical records that were available that may contribute to the patients current condition, including any previous evaluations for tinnitus    Nursing notes will be reviewed as they become available in realtime while the pt has been in the ED.   Sagar Peres, MD    EKG interpretation 2229: NSR, nl Axis, rate 72; , QRS 90, QTc 435; NO STEMI; interpreted by Gabby Dick MD    I personally reviewed/interpreted pt's imaging. Agree with official read by radiology as noted above. Gabby Dick MD    PROGRESS NOTE:  4:36 AM  Pt noted feel markedly improved after clonidine and improvement of blood pressure. Discussed mild elevation although likely not significant troponin. Will further evaluate with CTA due to elevated D-dimer. Patient reports being compliant with her Coumadin with therapeutic INR most recently tested. Notes that she has has a remote history of bilateral PEs in the past.    Gabby Dick MD      4:37 AM  Patient's presentation, labs/imaging and plan of care was reviewed with Dr. Gurwinder Mcmillan as part of sign out. They will follow up on CTA results as part of the plan discussed with the patient. Dr. Siva Mei assistance in completion of this plan is greatly appreciated but it should be noted that I will be the provider of record for this patient. Gabby Dick MD          ED Course as of 03/17/22 0825   Wed Mar 16, 2022   0715 D-dimer was elevated at 0.88 so CTA of chest was obtained. CTA negative for PE. Initial high-sensitivity troponin was 4 which increased to 24 on repeat. A third troponin was sent which was stable at 22. Will discharge per Dr. Maldonado Shannan. [AO]      ED Course User Index  [AO] Steffanie Baca MD       Critical Care Time:     none       Diagnosis     Clinical Impression:   1. Atypical chest pain    2. Elevated blood pressure reading        PLAN:  1. Current Discharge Medication List      START taking these medications    Details   cloNIDine HCL (CATAPRES) 0.1 mg tablet Take 1 Tablet by mouth as needed (hypertension). Qty: 10 Tablet, Refills: 0  Start date: 3/16/2022           2.    Follow-up Information     Follow up With Specialties Details Why Contact Info    Tyree Brand MD Family Medicine Schedule an appointment as soon as possible for a visit in 1 day  [address]  [phone]          Return to ED if worse     Disposition:    The patient's results have been reviewed with family and/or caregiver. They verbally convey their understanding and agreement of the patient's signs, symptoms, diagnosis, treatment and prognosis and additionally agree to follow up as recommended in the discharge instructions or to return to the Emergency Room should the patient's condition change prior to their follow-up appointment. The family and/or caregiver verbally agrees with the care-plan and all of their questions have been answered. The discharge instructions have also been provided to the them with educational information regarding the patient's diagnosis as well a list of reasons why the patient would want to return to the ER prior to their follow-up appointment should their condition change.   Juan Jacobs MD

## 2022-03-16 NOTE — Clinical Note
[address]  [facility] EMERGENCY DEPT  [address] [place] [address]  Juan Carlos Philip [id number]  [phone]    Work/School Note    Date: 3/15/2022    To Whom It May concern:    Barbara Reyes was seen and treated today in the emergency room by the following provider(s):  Attending Provider: Dimitry Cordero MD.      Barbara Reyes is excused from work/school on 03/16/22 and 03/17/22. She is medically clear to return to work/school on 3/18/2022.        Sincerely,          Lashae Sims MD

## 2022-03-16 NOTE — DISCHARGE INSTRUCTIONS
It was a pleasure taking care of you in our Emergency Department today. We know that when you [facility] to [facility], you are entrusting us with your health, comfort, and safety. Our physicians and nurses honor that trust, and truly appreciate the opportunity to care for you and your loved ones. We also value your feedback. If you receive a survey about your Emergency Department experience today, please fill it out. We care about our patients' feedback, and we listen to what you have to say. Thank you!       Dr. Rakan Escobar MD.

## 2022-03-16 NOTE — ED NOTES
Reviewed discharged instruction with patient. Discharge instruction include new prescription for clonidine, diagnosis and to follow up with PCP. Patient verbalized understanding of instructions. Patient ambulated out of ED in stable condition.

## 2022-08-10 ENCOUNTER — HOSPITAL ENCOUNTER (OUTPATIENT)
Dept: GENERAL RADIOLOGY | Age: 62
Discharge: HOME | End: 2022-08-10
Payer: COMMERCIAL

## 2022-08-10 ENCOUNTER — TRANSCRIBE ORDER (OUTPATIENT)
Dept: REGISTRATION | Age: 62
End: 2022-08-10

## 2022-08-10 DIAGNOSIS — M54.9 OTHER ACUTE BACK PAIN: Primary | ICD-10-CM

## 2022-08-10 DIAGNOSIS — M54.9 OTHER ACUTE BACK PAIN: ICD-10-CM

## 2022-08-10 DIAGNOSIS — M89.8X1 PAIN IN SCAPULA: ICD-10-CM

## 2022-08-10 PROCEDURE — 71046 X-RAY EXAM CHEST 2 VIEWS: CPT

## 2022-08-10 PROCEDURE — 73010 X-RAY EXAM OF SHOULDER BLADE: CPT

## 2022-08-24 ENCOUNTER — TRANSCRIBE ORDER (OUTPATIENT)
Dept: SCHEDULING | Age: 62
End: 2022-08-24

## 2022-08-24 DIAGNOSIS — S46.911A MUSCLE STRAIN OF SCAPULAR REGION, RIGHT, INITIAL ENCOUNTER: ICD-10-CM

## 2022-08-24 DIAGNOSIS — M54.9 BACK PAIN, UNSPECIFIED BACK LOCATION, UNSPECIFIED BACK PAIN LATERALITY, UNSPECIFIED CHRONICITY: Primary | ICD-10-CM

## 2023-02-23 ENCOUNTER — TRANSCRIBE ORDER (OUTPATIENT)
Dept: SCHEDULING | Age: 63
End: 2023-02-23

## 2023-02-23 DIAGNOSIS — R10.11 RUQ PAIN: Primary | ICD-10-CM

## 2023-02-23 DIAGNOSIS — R07.9 RIGHT-SIDED CHEST PAIN: ICD-10-CM

## 2023-02-28 ENCOUNTER — HOSPITAL ENCOUNTER (OUTPATIENT)
Dept: ULTRASOUND IMAGING | Age: 63
Discharge: HOME | End: 2023-02-28
Attending: FAMILY MEDICINE
Payer: COMMERCIAL

## 2023-02-28 DIAGNOSIS — R10.11 RUQ PAIN: ICD-10-CM

## 2023-02-28 DIAGNOSIS — R07.9 RIGHT-SIDED CHEST PAIN: ICD-10-CM

## 2023-02-28 PROCEDURE — 76700 US EXAM ABDOM COMPLETE: CPT

## 2023-03-07 ENCOUNTER — TRANSCRIBE ORDER (OUTPATIENT)
Dept: SCHEDULING | Age: 63
End: 2023-03-07

## 2023-03-07 DIAGNOSIS — N13.30 HYDRONEPHROSIS, UNSPECIFIED HYDRONEPHROSIS TYPE: Primary | ICD-10-CM

## 2023-03-07 DIAGNOSIS — K80.20 GALLSTONE: ICD-10-CM

## 2023-03-22 ENCOUNTER — HOSPITAL ENCOUNTER (OUTPATIENT)
Dept: NUCLEAR MEDICINE | Age: 63
Discharge: HOME | End: 2023-03-22
Attending: FAMILY MEDICINE
Payer: COMMERCIAL

## 2023-03-22 VITALS — BODY MASS INDEX: 23.63 KG/M2 | WEIGHT: 142 LBS

## 2023-03-22 DIAGNOSIS — K80.20 GALLSTONE: ICD-10-CM

## 2023-03-22 PROCEDURE — 74011250636 HC RX REV CODE- 250/636: Performed by: FAMILY MEDICINE

## 2023-03-22 PROCEDURE — 78227 HEPATOBIL SYST IMAGE W/DRUG: CPT

## 2023-03-22 RX ORDER — KIT FOR THE PREPARATION OF TECHNETIUM TC 99M MEBROFENIN 45 MG/10ML
5.3 INJECTION, POWDER, LYOPHILIZED, FOR SOLUTION INTRAVENOUS
Status: COMPLETED | OUTPATIENT
Start: 2023-03-22 | End: 2023-03-22

## 2023-03-22 RX ADMIN — KIT FOR THE PREPARATION OF TECHNETIUM TC 99M MEBROFENIN 5.3 MILLICURIE: 45 INJECTION, POWDER, LYOPHILIZED, FOR SOLUTION INTRAVENOUS at 09:30

## 2023-03-22 RX ADMIN — SINCALIDE 1.29 MCG: 5 INJECTION, POWDER, LYOPHILIZED, FOR SOLUTION INTRAVENOUS at 10:25

## 2023-03-28 ENCOUNTER — HOSPITAL ENCOUNTER (OUTPATIENT)
Dept: CT IMAGING | Age: 63
Discharge: HOME | End: 2023-03-28
Attending: FAMILY MEDICINE
Payer: COMMERCIAL

## 2023-03-28 DIAGNOSIS — N13.30 HYDRONEPHROSIS, UNSPECIFIED HYDRONEPHROSIS TYPE: ICD-10-CM

## 2023-03-28 PROCEDURE — 74178 CT ABD&PLV WO CNTR FLWD CNTR: CPT

## 2023-03-28 PROCEDURE — 74011000636 HC RX REV CODE- 636: Performed by: FAMILY MEDICINE

## 2023-03-28 RX ADMIN — IOMEPROL INJECTION 100 ML: 714 INJECTION, SOLUTION INTRAVASCULAR at 16:39

## 2023-04-21 DIAGNOSIS — M54.9 BACK PAIN, UNSPECIFIED BACK LOCATION, UNSPECIFIED BACK PAIN LATERALITY, UNSPECIFIED CHRONICITY: Primary | ICD-10-CM

## 2023-04-22 DIAGNOSIS — S46.911A MUSCLE STRAIN OF SCAPULAR REGION, RIGHT, INITIAL ENCOUNTER: ICD-10-CM

## 2023-04-22 DIAGNOSIS — M54.9 BACK PAIN, UNSPECIFIED BACK LOCATION, UNSPECIFIED BACK PAIN LATERALITY, UNSPECIFIED CHRONICITY: Primary | ICD-10-CM

## 2023-06-06 RX ORDER — BEMPEDOIC ACID 180 MG/1
1 TABLET, FILM COATED ORAL DAILY
COMMUNITY

## 2023-06-06 RX ORDER — CYCLOSPORINE 0 G/ML
SOLUTION/ DROPS OPHTHALMIC; TOPICAL 2 TIMES DAILY
COMMUNITY

## 2023-06-07 ENCOUNTER — ANESTHESIA (OUTPATIENT)
Facility: HOSPITAL | Age: 63
End: 2023-06-07
Payer: COMMERCIAL

## 2023-06-07 ENCOUNTER — HOSPITAL ENCOUNTER (OUTPATIENT)
Facility: HOSPITAL | Age: 63
Setting detail: OUTPATIENT SURGERY
Discharge: HOME | End: 2023-06-07
Attending: INTERNAL MEDICINE | Admitting: INTERNAL MEDICINE
Payer: COMMERCIAL

## 2023-06-07 ENCOUNTER — ANESTHESIA EVENT (OUTPATIENT)
Facility: HOSPITAL | Age: 63
End: 2023-06-07
Payer: COMMERCIAL

## 2023-06-07 VITALS
RESPIRATION RATE: 22 BRPM | BODY MASS INDEX: 23.88 KG/M2 | TEMPERATURE: 98 F | HEIGHT: 65 IN | SYSTOLIC BLOOD PRESSURE: 118 MMHG | DIASTOLIC BLOOD PRESSURE: 73 MMHG | HEART RATE: 84 BPM | WEIGHT: 143.3 LBS | OXYGEN SATURATION: 100 %

## 2023-06-07 LAB
GLUCOSE BLD STRIP.AUTO-MCNC: 77 MG/DL (ref 65–117)
SERVICE CMNT-IMP: NORMAL

## 2023-06-07 PROCEDURE — 2500000003 HC RX 250 WO HCPCS: Performed by: NURSE ANESTHETIST, CERTIFIED REGISTERED

## 2023-06-07 PROCEDURE — 6360000002 HC RX W HCPCS: Performed by: NURSE ANESTHETIST, CERTIFIED REGISTERED

## 2023-06-07 PROCEDURE — 3600007512: Performed by: INTERNAL MEDICINE

## 2023-06-07 PROCEDURE — 3700000000 HC ANESTHESIA ATTENDED CARE: Performed by: INTERNAL MEDICINE

## 2023-06-07 PROCEDURE — 3700000001 HC ADD 15 MINUTES (ANESTHESIA): Performed by: INTERNAL MEDICINE

## 2023-06-07 PROCEDURE — 3600007502: Performed by: INTERNAL MEDICINE

## 2023-06-07 PROCEDURE — 7100000010 HC PHASE II RECOVERY - FIRST 15 MIN: Performed by: INTERNAL MEDICINE

## 2023-06-07 PROCEDURE — 2709999900 HC NON-CHARGEABLE SUPPLY: Performed by: INTERNAL MEDICINE

## 2023-06-07 PROCEDURE — 82962 GLUCOSE BLOOD TEST: CPT

## 2023-06-07 RX ORDER — SODIUM CHLORIDE 0.9 % (FLUSH) 0.9 %
5-40 SYRINGE (ML) INJECTION PRN
Status: DISCONTINUED | OUTPATIENT
Start: 2023-06-07 | End: 2023-06-07 | Stop reason: HOSPADM

## 2023-06-07 RX ORDER — LIDOCAINE HYDROCHLORIDE 20 MG/ML
INJECTION, SOLUTION EPIDURAL; INFILTRATION; INTRACAUDAL; PERINEURAL PRN
Status: DISCONTINUED | OUTPATIENT
Start: 2023-06-07 | End: 2023-06-07 | Stop reason: SDUPTHER

## 2023-06-07 RX ORDER — SODIUM CHLORIDE 0.9 % (FLUSH) 0.9 %
5-40 SYRINGE (ML) INJECTION EVERY 12 HOURS SCHEDULED
Status: DISCONTINUED | OUTPATIENT
Start: 2023-06-07 | End: 2023-06-07 | Stop reason: HOSPADM

## 2023-06-07 RX ORDER — PROPOFOL 10 MG/ML
INJECTION, EMULSION INTRAVENOUS PRN
Status: DISCONTINUED | OUTPATIENT
Start: 2023-06-07 | End: 2023-06-07 | Stop reason: SDUPTHER

## 2023-06-07 RX ORDER — SODIUM CHLORIDE 9 MG/ML
25 INJECTION, SOLUTION INTRAVENOUS PRN
Status: DISCONTINUED | OUTPATIENT
Start: 2023-06-07 | End: 2023-06-07 | Stop reason: HOSPADM

## 2023-06-07 RX ADMIN — LIDOCAINE HYDROCHLORIDE 25 MG: 20 INJECTION, SOLUTION EPIDURAL; INFILTRATION; INTRACAUDAL; PERINEURAL at 14:51

## 2023-06-07 RX ADMIN — PROPOFOL 100 MG: 10 INJECTION, EMULSION INTRAVENOUS at 15:01

## 2023-06-07 RX ADMIN — PROPOFOL 100 MG: 10 INJECTION, EMULSION INTRAVENOUS at 14:55

## 2023-06-07 RX ADMIN — PROPOFOL 100 MG: 10 INJECTION, EMULSION INTRAVENOUS at 14:51

## 2023-06-07 ASSESSMENT — PAIN - FUNCTIONAL ASSESSMENT: PAIN_FUNCTIONAL_ASSESSMENT: 0-10

## 2023-06-07 NOTE — PROGRESS NOTES
Endoscopy discharge instructions have been reviewed and given to patient. The patient verbalized understanding and acceptance of instructions. Dr. Bert Moreno discussed with patient procedure findings and next steps.

## 2023-06-07 NOTE — H&P
speaks in complete sentences. Auscultation: normal breath sounds, no rubs, wheezes or rhonchi. Cardiovascular: Auscultation: normal, S1 and S2,no gallops,no rubs or murmurs. Gastrointestinal/Abdomen:  Abdomen: non-distended, nontender. Liver/Spleen: normal,normal size,Liver size and consistency normal, spleen is non-palpable. Psychiatric:  Judgment/insight: Normal,normal judgement, normal insight. Mood and affect: Normal mood, affect full,no evidence of depression, anxiety or agitation. Impressions:   Long term (current) use of anticoagulants  Screening colonoscopy (Screening for colonic neoplasia)    Plan:   Colonoscopy  stop warfarin 5 days before exam  Colonoscopy/Biopsy/Polypectomy: options, risks, benefits and complications of bleeding, tear, surgical repair (1:1000) & 1:100 post Polypectomy, and reaction of medication, aspiration, Pneumonia explained to patient, 5% chance of missing colon cancer and other lesions explained. All questions answered.

## 2023-06-07 NOTE — PROGRESS NOTES
1451  Timeout performed. Anesthesia staff at patient's bedside administering anesthesia and monitoring patients vital signs throughout procedure. See anesthesia note. Post procedure, report received from Lula MOORE. 12  Endoscope was pre-cleaned at bedside immediately following procedure by endo Moises bucio. 1508  Patient tolerated procedure. Abdomen soft and patient arousable and voices no complaints. Patient transported to endoscopy recovery area. Report given to post procedure RNTyler.

## 2023-06-07 NOTE — ANESTHESIA POSTPROCEDURE EVALUATION
Department of Anesthesiology  Postprocedure Note    Patient: Kristel Mccall  MRN: [id number]  YOB: 1960  Date of evaluation: 6/7/2023      Procedure Summary     Date: 06/07/23 Room / Location: [facility] 02 / [facility] ENDOSCOPY    Anesthesia Start: 1445 Anesthesia Stop: 1507    Procedure: COLONOSCOPY DIAGNOSTIC (Lower GI Region) Diagnosis:       Screening for colon cancer      (Screening for colon cancer [Z12.11])    Surgeons: Saige Gibson MD Responsible Provider: Aren Zepeda MD    Anesthesia Type: MAC ASA Status: 2          Anesthesia Type: MAC    Paramjit Phase I: Paramjit Score: 10    Paramjit Phase II: Paramjit Score: 10      Anesthesia Post Evaluation    Patient location during evaluation: bedside  Patient participation: complete - patient participated  Level of consciousness: awake  Airway patency: patent  Nausea & Vomiting: no vomiting and no nausea  Complications: no  Cardiovascular status: hemodynamically stable  Respiratory status: acceptable  Hydration status: stable

## 2023-06-07 NOTE — DISCHARGE INSTRUCTIONS
Dominique Keys  [id number]  1960    DISCHARGE INSTRUCTIONS    Impression:  Normal colonoscopy to the cecum, with no evidence of neoplasia, diverticular disease, or mucosal abnormality. Recommendations:     - For colon cancer screening in this average-risk patient, colonoscopy may be repeated in 10 years. Discomfort:  Redness at IV site- apply warm compress to area; if redness or soreness persist- contact your physician. There may be a slight amount of blood passed from the rectum. Gaseous discomfort - walking, belching will help relieve any discomfort. You may not operate a vehicle for 12 hours. You may not engage in an occupation involving machinery or appliances for rest of today. You may not drink alcoholic beverages for at least 12 hours. Avoid making any critical decisions for at least 24 hours. DIET:   High fiber diet. Medications:                Resume usual medications today   ACTIVITY:  You may resume your normal daily activities it is recommended that you spend the remainder of the day resting -  avoid any strenuous activity. CALL M.D. ANY SIGN OF:   Increasing pain, nausea, vomiting  Abdominal distension (swelling)  New increased bleeding (oral or rectal)  Fever (chills)  Pain in chest area  Bloody discharge from nose or mouth  Shortness of breath     Follow-up Instructions:  Call Dr. Radhika Fleming if you have any questions or problems.           DISCHARGE SUMMARY from Nurse    The following personal items collected during your admission are returned to you:   Dental Appliance:    Vision:    Hearing Aid:    Jewelry:    Clothing:    Other Valuables:    Valuables sent to safe: Dose (mL/hr) Propofol : *100 mg

## 2023-06-07 NOTE — PROGRESS NOTES
Chi Scripture  1960  [id number]    Situation:  Verbal report received from: [facility] RN   Procedure: Procedure(s):  COLONOSCOPY DIAGNOSTIC    Background:    Preoperative diagnosis: Screening for colon cancer [Z12.11]  Postoperative diagnosis: * No post-op diagnosis entered *    :  Dr. Bert Moreno  Assistant(s): Circulator: Jamar Rodgers RN  Endoscopy Technician: Keanu Hastings    Specimens: * No specimens in log *  H. Pylori  No    Assessment:    Anesthesia gave intra-procedure sedation and medications, see anesthesia flow sheet No    Intravenous fluids: NS@ [facility]     Vital signs stable     Abdominal assessment: round and soft     Recommendation:  Discharge patient per MD order.   Return to floor  Family or Friend   Permission to share finding with family or friend Yes

## 2023-06-07 NOTE — OP NOTE
the procedure well. Estimated blood loss: none    Impression:  Normal colonoscopy to the cecum, with no evidence of neoplasia, diverticular disease, or mucosal abnormality. Recommendations:     - For colon cancer screening in this average-risk patient, colonoscopy may be repeated in 10 years. Thank you for entrusting me with this patient's care. Please do not hesitate to contact me with any questions or if I can be of assistance with any of your other patients' GI needs.     Signed By: Flavio Hammans, MD                        June 7, 2023

## 2024-08-12 ENCOUNTER — TRANSCRIBE ORDERS (OUTPATIENT)
Facility: HOSPITAL | Age: 64
End: 2024-08-12

## 2024-08-12 ENCOUNTER — HOSPITAL ENCOUNTER (OUTPATIENT)
Facility: HOSPITAL | Age: 64
Discharge: HOME | End: 2024-08-15
Payer: COMMERCIAL

## 2024-08-12 DIAGNOSIS — R05.9 COUGH, UNSPECIFIED TYPE: ICD-10-CM

## 2024-08-12 DIAGNOSIS — R53.83 OTHER FATIGUE: ICD-10-CM

## 2024-08-12 DIAGNOSIS — M79.10 MYALGIA: ICD-10-CM

## 2024-08-12 DIAGNOSIS — M79.10 MYALGIA: Primary | ICD-10-CM

## 2024-08-12 PROCEDURE — 71046 X-RAY EXAM CHEST 2 VIEWS: CPT

## 2024-08-22 ENCOUNTER — APPOINTMENT (OUTPATIENT)
Facility: HOSPITAL | Age: 64
End: 2024-08-22
Payer: COMMERCIAL

## 2024-08-22 ENCOUNTER — HOSPITAL ENCOUNTER (EMERGENCY)
Facility: HOSPITAL | Age: 64
Discharge: HOME | End: 2024-08-22
Payer: COMMERCIAL

## 2024-08-22 VITALS
TEMPERATURE: 98.4 F | SYSTOLIC BLOOD PRESSURE: 155 MMHG | WEIGHT: 150.35 LBS | RESPIRATION RATE: 16 BRPM | HEART RATE: 84 BPM | DIASTOLIC BLOOD PRESSURE: 90 MMHG | OXYGEN SATURATION: 98 % | BODY MASS INDEX: 25.02 KG/M2

## 2024-08-22 DIAGNOSIS — S32.020A CLOSED COMPRESSION FRACTURE OF L2 LUMBAR VERTEBRA, INITIAL ENCOUNTER (HCC): Primary | ICD-10-CM

## 2024-08-22 LAB
ALBUMIN SERPL-MCNC: 4 G/DL (ref 3.5–5)
ALBUMIN/GLOB SERPL: 1.3 (ref 1.1–2.2)
ALP SERPL-CCNC: 66 U/L (ref 45–117)
ALT SERPL-CCNC: 33 U/L (ref 12–78)
ANION GAP SERPL CALC-SCNC: 5 MMOL/L (ref 5–15)
APPEARANCE UR: CLEAR
AST SERPL-CCNC: 19 U/L (ref 15–37)
BACTERIA URNS QL MICRO: NEGATIVE /HPF
BASOPHILS # BLD: 0.1 K/UL (ref 0–0.1)
BASOPHILS NFR BLD: 1 % (ref 0–1)
BILIRUB SERPL-MCNC: 0.4 MG/DL (ref 0.2–1)
BILIRUB UR QL: NEGATIVE
BUN SERPL-MCNC: 13 MG/DL (ref 6–20)
BUN/CREAT SERPL: 21 (ref 12–20)
CALCIUM SERPL-MCNC: 9.3 MG/DL (ref 8.5–10.1)
CHLORIDE SERPL-SCNC: 102 MMOL/L (ref 97–108)
CO2 SERPL-SCNC: 29 MMOL/L (ref 21–32)
COLOR UR: ABNORMAL
CREAT SERPL-MCNC: 0.61 MG/DL (ref 0.55–1.02)
DIFFERENTIAL METHOD BLD: ABNORMAL
EOSINOPHIL # BLD: 0.2 K/UL (ref 0–0.4)
EOSINOPHIL NFR BLD: 2 % (ref 0–7)
EPITH CASTS URNS QL MICRO: ABNORMAL /LPF
ERYTHROCYTE [DISTWIDTH] IN BLOOD BY AUTOMATED COUNT: 12.6 % (ref 11.5–14.5)
GLOBULIN SER CALC-MCNC: 3.2 G/DL (ref 2–4)
GLUCOSE SERPL-MCNC: 92 MG/DL (ref 65–100)
GLUCOSE UR STRIP.AUTO-MCNC: NEGATIVE MG/DL
HCT VFR BLD AUTO: 35.9 % (ref 35–47)
HGB BLD-MCNC: 12 G/DL (ref 11.5–16)
HGB UR QL STRIP: NEGATIVE
IMM GRANULOCYTES # BLD AUTO: 0.1 K/UL (ref 0–0.04)
IMM GRANULOCYTES NFR BLD AUTO: 1 % (ref 0–0.5)
KETONES UR QL STRIP.AUTO: NEGATIVE MG/DL
LEUKOCYTE ESTERASE UR QL STRIP.AUTO: ABNORMAL
LIPASE SERPL-CCNC: 62 U/L (ref 13–75)
LYMPHOCYTES # BLD: 2.9 K/UL (ref 0.8–3.5)
LYMPHOCYTES NFR BLD: 42 % (ref 12–49)
MCH RBC QN AUTO: 30 PG (ref 26–34)
MCHC RBC AUTO-ENTMCNC: 33.4 G/DL (ref 30–36.5)
MCV RBC AUTO: 89.8 FL (ref 80–99)
MONOCYTES # BLD: 0.7 K/UL (ref 0–1)
MONOCYTES NFR BLD: 10 % (ref 5–13)
NEUTS SEG # BLD: 3 K/UL (ref 1.8–8)
NEUTS SEG NFR BLD: 44 % (ref 32–75)
NITRITE UR QL STRIP.AUTO: NEGATIVE
NRBC # BLD: 0 K/UL (ref 0–0.01)
NRBC BLD-RTO: 0 PER 100 WBC
PH UR STRIP: 7 (ref 5–8)
PLATELET # BLD AUTO: 409 K/UL (ref 150–400)
PMV BLD AUTO: 9.3 FL (ref 8.9–12.9)
POTASSIUM SERPL-SCNC: 4.1 MMOL/L (ref 3.5–5.1)
PROT SERPL-MCNC: 7.2 G/DL (ref 6.4–8.2)
PROT UR STRIP-MCNC: NEGATIVE MG/DL
RBC # BLD AUTO: 4 M/UL (ref 3.8–5.2)
RBC #/AREA URNS HPF: ABNORMAL /HPF (ref 0–5)
SODIUM SERPL-SCNC: 136 MMOL/L (ref 136–145)
SP GR UR REFRACTOMETRY: 1.01
URINE CULTURE IF INDICATED: ABNORMAL
UROBILINOGEN UR QL STRIP.AUTO: 0.2 EU/DL (ref 0.2–1)
WBC # BLD AUTO: 6.8 K/UL (ref 3.6–11)
WBC URNS QL MICRO: ABNORMAL /HPF (ref 0–4)

## 2024-08-22 PROCEDURE — 6360000002 HC RX W HCPCS

## 2024-08-22 PROCEDURE — 36415 COLL VENOUS BLD VENIPUNCTURE: CPT

## 2024-08-22 PROCEDURE — 80053 COMPREHEN METABOLIC PANEL: CPT

## 2024-08-22 PROCEDURE — 83690 ASSAY OF LIPASE: CPT

## 2024-08-22 PROCEDURE — 74177 CT ABD & PELVIS W/CONTRAST: CPT

## 2024-08-22 PROCEDURE — 96374 THER/PROPH/DIAG INJ IV PUSH: CPT

## 2024-08-22 PROCEDURE — 6370000000 HC RX 637 (ALT 250 FOR IP)

## 2024-08-22 PROCEDURE — 99285 EMERGENCY DEPT VISIT HI MDM: CPT

## 2024-08-22 PROCEDURE — 85025 COMPLETE CBC W/AUTO DIFF WBC: CPT

## 2024-08-22 PROCEDURE — 6360000004 HC RX CONTRAST MEDICATION

## 2024-08-22 PROCEDURE — 81001 URINALYSIS AUTO W/SCOPE: CPT

## 2024-08-22 RX ORDER — KETOROLAC TROMETHAMINE 30 MG/ML
15 INJECTION, SOLUTION INTRAMUSCULAR; INTRAVENOUS
Status: COMPLETED | OUTPATIENT
Start: 2024-08-22 | End: 2024-08-22

## 2024-08-22 RX ORDER — DIAZEPAM 5 MG/1
5 TABLET ORAL ONCE
Status: COMPLETED | OUTPATIENT
Start: 2024-08-22 | End: 2024-08-22

## 2024-08-22 RX ORDER — HYDROCODONE BITARTRATE AND ACETAMINOPHEN 5; 325 MG/1; MG/1
1 TABLET ORAL EVERY 8 HOURS PRN
Qty: 9 TABLET | Refills: 0 | Status: SHIPPED | OUTPATIENT
Start: 2024-08-22 | End: 2024-08-25

## 2024-08-22 RX ORDER — ACETAMINOPHEN 325 MG/1
650 TABLET ORAL
Status: COMPLETED | OUTPATIENT
Start: 2024-08-22 | End: 2024-08-22

## 2024-08-22 RX ADMIN — IOPAMIDOL 100 ML: 755 INJECTION, SOLUTION INTRAVENOUS at 18:57

## 2024-08-22 RX ADMIN — ACETAMINOPHEN 650 MG: 325 TABLET ORAL at 17:57

## 2024-08-22 RX ADMIN — KETOROLAC TROMETHAMINE 15 MG: 30 INJECTION, SOLUTION INTRAMUSCULAR at 17:59

## 2024-08-22 RX ADMIN — DIAZEPAM 5 MG: 5 TABLET ORAL at 17:57

## 2024-08-22 ASSESSMENT — PAIN SCALES - GENERAL
PAINLEVEL_OUTOF10: 7

## 2024-08-22 NOTE — ED PROVIDER NOTES
[facility] EMERGENCY DEPT  EMERGENCY DEPARTMENT ENCOUNTER       Pt Name: Edy Ruby  MRN: [id number]  Birthdate 1960  Date of evaluation: 8/22/2024  Provider: Kellen Barrientos PA-C   PCP: Aure Marina MD  Note Started: 5:52 PM EDT 8/22/24     CHIEF COMPLAINT       Chief Complaint   Patient presents with    Back Pain     Ambulatory c/o b/l lower back pain ongoing x2 weeks. Unable to describe pain, states it's constant but worse upon coughing, moving, and having trouble sleeping. Patient was seen at PCP and had blood and urine tests done towards the beginning of these issues, everything was negative per the patient.         HISTORY OF PRESENT ILLNESS: 1 or more elements      History From: Patient  HPI Limitations: None     Edy Ruby is a 64 y.o. 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  Patient describes it as a sharp, constant pain in bilateral lower back.  She reports that she was sick approximately 2 weeks ago with viral URI symptoms and she started having back pain during this,, treated with a Z-Jeramie, prednisone taper and her URI symptoms subsequently resolved however she still having persistent back pain.  She notes that she was coughing a lot with her URI symptoms, she reports that the pain is worse when she moves, worse when she coughs, worse when she sneezes.  She does complain of some associated urinary frequency and urinary hesitancy.  She denies radiation of pain to her legs or abdomen.  She denies extremity numbness, extremities, extremity tingling.  She denies any recent falls or traumatic injury.  She denies bowel/bladder incontinence, saddle anesthesia.  She denies      Nursing Notes were all reviewed and agreed with or any disagreements were addressed in the HPI.     REVIEW OF SYSTEMS      Review of Systems     Positives and Pertinent negatives as per HPI.    PAST HISTORY     Past Medical History:  Past

## 2024-09-23 ENCOUNTER — HOSPITAL ENCOUNTER (OUTPATIENT)
Facility: HOSPITAL | Age: 64
Discharge: HOME | End: 2024-09-26
Attending: ORTHOPAEDIC SURGERY
Payer: OTHER GOVERNMENT

## 2024-09-23 DIAGNOSIS — S32.020G: ICD-10-CM

## 2024-09-23 DIAGNOSIS — M41.50 DEGENERATIVE SCOLIOSIS: ICD-10-CM

## 2024-09-23 DIAGNOSIS — M51.36 DDD (DEGENERATIVE DISC DISEASE), LUMBAR: ICD-10-CM

## 2024-09-23 DIAGNOSIS — M43.16 SPONDYLOLISTHESIS OF LUMBAR REGION: ICD-10-CM

## 2024-09-23 DIAGNOSIS — M47.816 LUMBAR SPONDYLOSIS: ICD-10-CM

## 2024-09-23 DIAGNOSIS — M54.50 LUMBAR PAIN: ICD-10-CM

## 2024-09-23 PROCEDURE — 72148 MRI LUMBAR SPINE W/O DYE: CPT

## 2024-11-16 ENCOUNTER — HOSPITAL ENCOUNTER (OUTPATIENT)
Facility: HOSPITAL | Age: 64
Discharge: HOME | End: 2024-11-19
Attending: FAMILY MEDICINE
Payer: COMMERCIAL

## 2024-11-16 DIAGNOSIS — S32.020D COMPRESSION FRACTURE OF L2, WITH ROUTINE HEALING, SUBSEQUENT ENCOUNTER: ICD-10-CM

## 2024-11-16 PROCEDURE — 77080 DXA BONE DENSITY AXIAL: CPT

## (undated) DEVICE — FORCEPS BX L240CM JAW DIA2.8MM L CAP W/ NDL MIC MESH TOOTH

## (undated) DEVICE — CONTAINER SPEC 20 ML LID NEUT BUFF FORMALIN 10 % POLYPR STS